# Patient Record
Sex: FEMALE | Race: BLACK OR AFRICAN AMERICAN | Employment: OTHER | URBAN - METROPOLITAN AREA
[De-identification: names, ages, dates, MRNs, and addresses within clinical notes are randomized per-mention and may not be internally consistent; named-entity substitution may affect disease eponyms.]

---

## 2018-05-18 ENCOUNTER — OFFICE VISIT (OUTPATIENT)
Dept: VASCULAR SURGERY | Age: 83
End: 2018-05-18

## 2018-05-18 VITALS
RESPIRATION RATE: 17 BRPM | BODY MASS INDEX: 16.33 KG/M2 | HEIGHT: 65 IN | HEART RATE: 50 BPM | SYSTOLIC BLOOD PRESSURE: 90 MMHG | WEIGHT: 98 LBS | DIASTOLIC BLOOD PRESSURE: 48 MMHG

## 2018-05-18 DIAGNOSIS — M20.61 DEFORMITY OF TOE OF RIGHT FOOT: ICD-10-CM

## 2018-05-18 DIAGNOSIS — I73.9 PAD (PERIPHERAL ARTERY DISEASE) (HCC): Primary | ICD-10-CM

## 2018-05-18 RX ORDER — HYDROCHLOROTHIAZIDE 25 MG/1
25 TABLET ORAL DAILY
COMMUNITY
End: 2020-01-01

## 2018-05-18 RX ORDER — METOPROLOL TARTRATE 50 MG/1
TABLET ORAL 2 TIMES DAILY
COMMUNITY
End: 2020-01-01

## 2018-05-18 RX ORDER — QUETIAPINE FUMARATE 50 MG/1
50 TABLET, FILM COATED ORAL 2 TIMES DAILY
COMMUNITY
End: 2020-01-01

## 2018-05-18 RX ORDER — CILOSTAZOL 100 MG/1
100 TABLET ORAL
Qty: 60 TAB | Refills: 3 | Status: SHIPPED | OUTPATIENT
Start: 2018-05-18 | End: 2020-01-01

## 2018-05-18 RX ORDER — OMEPRAZOLE 20 MG/1
20 CAPSULE, DELAYED RELEASE ORAL DAILY
COMMUNITY

## 2018-05-18 RX ORDER — MEMANTINE HYDROCHLORIDE 10 MG/1
TABLET ORAL DAILY
COMMUNITY

## 2018-05-18 NOTE — PROGRESS NOTES
Vignesh Hawley    Chief Complaint   Patient presents with   [employer] New Patient    Leg Pain     Poor circulation       History and Physical    80year-old female here for evaluation of peripheral artery disease. She is here with her niece and caregiver. She has had a long history of PAD she has had some interventions on the right leg with good success several years ago. 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 She is well cared for at home she lives with family she is able to do transfers and short distance walking with a walker otherwise uses wheelchair for assistance. She is a nondiabetic with hypertension gastroesophageal reflux. She has no renal insufficiency non-smoker. She is suffering from dementia as well. She has no rest pain she has some occasional foot pain seems to be more positional.  No open wounds currently    Past Medical History:   Diagnosis Date    Glaucoma     Heart abnormality     arrythmia    Hypertension     Reflux     Short leg syndrome, right, acquired      Patient Active Problem List   Diagnosis Code    HTN (hypertension) I10    Lipid disorder E78.9    Glaucoma H40.9    GERD (gastroesophageal reflux disease) K21.9    PAD [facility]peripheral artery disease) ([facility]) I73.9    Toe deformity M20.60     Past Surgical History:   Procedure Laterality Date    HX HEENT      VASCULAR SURGERY PROCEDURE UNLIST       Current Outpatient Prescriptions   Medication Sig Dispense Refill    omeprazole (PRILOSEC) 20 mg capsule Take 20 mg by mouth daily.  hydroCHLOROthiazide (HYDRODIURIL) 25 mg tablet Take 25 mg by mouth daily.  metoprolol tartrate (LOPRESSOR) 50 mg tablet Take  by mouth two (2) times a day.  memantine (NAMENDA) 10 mg tablet Take  by mouth daily.  QUEtiapine (SEROQUEL) 50 mg tablet Take 50 mg by mouth two (2) times a day.  cilostazol (PLETAL) 100 mg tablet Take 1 Tab by mouth Before breakfast and dinner.  60 Tab 3    aspirin delayed-release 81 mg tablet Take  by mouth daily.  senna (SENOKOT) 8.6 mg tablet Take 1 Tab by mouth daily.  polyethylene glycol (MIRALAX) 17 gram packet Take 17 g by mouth daily.  amLODIPine (NORVASC) 10 mg tablet Take  by mouth daily.  therapeutic multivitamin (THERAGRAN) tablet Take 1 Tab by mouth daily.  Calcium Gluconate 60 mg (648 mg) tab Take  by mouth.  levobunolol (BETAGAN) 0.5 % ophthalmic solution Administer 1 Drop to both eyes nightly.  brinzolamide (AZOPT) 1 % ophthalmic suspension Administer 1 Drop to both eyes three (3) times daily.  magnesium citrate solution Take 296 mL by mouth now.  metoclopramide HCl (REGLAN) 10 mg tablet Take 10 mg by mouth Before breakfast, lunch, dinner and at bedtime.  traMADol (ULTRAM) 50 mg tablet Take 50 mg by mouth every six (6) hours as needed for Pain.  atorvastatin (LIPITOR) 10 mg tablet Take  by mouth daily.  meloxicam (MOBIC) 7.5 mg tablet Take  by mouth daily.  meclizine (ANTIVERT) 25 mg tablet Take  by mouth three (3) times daily as needed.  RABEprazole (ACIPHEX) 20 mg tablet Take 20 mg by mouth daily. Allergies   Allergen Reactions    Tylenol [Acetaminophen] Other (comments)     GI distress       Review of Systems    A full review of systems was completed times ten organ systems and was deemed negative unless otherwise mentioned in the HPI.     Physical   Visit Vitals    BP 90/48 (BP 1 Location: Right arm)    Pulse (!) 50    Resp 17    Ht 5' 5\" (1.651 m)    Wt 98 lb (44.5 kg)    BMI 16.31 kg/m2       Youthful appearing for 93 head is normocephalic and atraumatic  Neck no JVD I hear no carotid bruit  Chest clear bilaterally  Cardiac regular  Abdomen soft flat nontender  Lower extremities notable there is right lower extremity deformity with equinus formation right foot, no  skin ulceration  Nonpalpable pedal pulses, skin intact  Reviewed her office information from Dr. Arminda Roldan he notably did an angiogram on the right lower extremity showing a small common femoral profunda a small SFA that is uniformly patent at 2-2.5 mm popliteal small and tibial occlusion of all 3 vessels, made attempt of wire passage without any success. Impression/Plan:     ICD-10-CM ICD-9-CM    1. PAD (peripheral artery disease) ([facility]) I73.9 443.9 cilostazol (PLETAL) 100 mg tablet   2. 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  Surgery likely not to give long-term benefit, small vessels with poor targets  Talked about initiation of medical therapy including Pletal she will remain on her aspirin  We went over issues of tachycardia or dysrhythmia and diarrhea  She will call if she has any side effects from Pletal, she is interested in initiating this for small vessel disease  Schedule office visit for 6 weeks  Orders Placed This Encounter    omeprazole (PRILOSEC) 20 mg capsule    hydroCHLOROthiazide (HYDRODIURIL) 25 mg tablet    metoprolol tartrate (LOPRESSOR) 50 mg tablet    memantine (NAMENDA) 10 mg tablet    QUEtiapine (SEROQUEL) 50 mg tablet    cilostazol (PLETAL) 100 mg tablet       Follow-up Disposition: Not on 2020 Amanda Townsend MD    PLEASE NOTE:  This document has been produced using voice recognition software. Unrecognized errors in transcription may be present.

## 2018-05-18 NOTE — PROGRESS NOTES
1. Have you been to an emergency room or urgent care clinic since your last visit? Yes [place], [place]    Hospitalized since your last visit? If yes, where, when, and reason for visit? [facility]    2. Have you seen or consulted any other health care providers outside of the [facility] since your last visit including any procedures, health maintenance items. If yes, where, when and reason for visit?   NO

## 2018-05-18 NOTE — MR AVS SNAPSHOT
[address] [address] 776 [facility] 
[phone] Patient: Scar Jain MRN: [id number] Visit Information Date & Time Provider Department Dept. Phone Encounter #  
 2018 11:00 AM Rashid Adams  [facility] [phone] Your Appointments 2018 11:45 AM  
Follow Up with Rashid Adams MD  
600 [facility] Appt Note: 6 WEEK FOLLOW UP WENT 7 DUE TO TRANSPORTATION WILL BE OUT OF STATE  
 [address] 145 266 [facility]  
[phone] 2300 [facility] [address] [place] [address] Upcoming Health Maintenance Date Due DTaP/Tdap/Td series (1 - Tdap) 1945 ZOSTER VACCINE AGE 60> 1984 GLAUCOMA SCREENING Q2Y 1989 Bone Densitometry (Dexa) Screening 1989 Pneumococcal 65+ Low/Medium Risk (1 of 2 - PCV13) 1989 MEDICARE YEARLY EXAM 3/14/2018 Influenza Age 5 to Adult 2018 Allergies as of 2018  Review Complete On: 2018 By: Rashid Adams MD  
  
 Severity Noted Reaction Type Reactions Tylenol [Acetaminophen]  2014    Other (comments) GI distress Current Immunizations  Never Reviewed No immunizations on file. Not reviewed this visit You Were Diagnosed With   
  
 Codes Comments PAD (peripheral artery disease) (HCC)    -  Primary ICD-10-CM: I73.9 ICD-9-CM: 443.9 Deformity of toe of right foot     ICD-10-CM: M20.61 ICD-9-CM: 735.9 Vitals BP Pulse Resp Height(growth percentile) Weight(growth percentile) BMI  
 90/48 (BP 1 Location: Right arm) (!) 50 17 5' 5\" (1.651 m) 98 lb (44.5 kg) 16.31 kg/m2 Smoking Status Never Smoker Vitals History BMI and BSA Data Body Mass Index Body Surface Area  
 16.31 kg/m 2 1.43 m 2 Your Updated Medication List  
 This list is accurate as of 5/18/18 12:23 PM.  Always use your most recent med list.  
  
  
  
  
 ACIPHEX 20 mg tablet Generic drug:  RABEprazole Take 20 mg by mouth daily. ANTIVERT 25 mg tablet Generic drug:  meclizine Take  by mouth three (3) times daily as needed. aspirin delayed-release 81 mg tablet Take  by mouth daily. AZOPT 1 % ophthalmic suspension Generic drug:  brinzolamide Administer 1 Drop to both eyes three (3) times daily. BETAGAN 0.5 % ophthalmic solution Generic drug:  levobunolol Administer 1 Drop to both eyes nightly. Calcium Gluconate 60 mg (648 mg) Tab Take  by mouth.  
  
 cilostazol 100 mg tablet Commonly known as:  PLETAL Take 1 Tab by mouth Before breakfast and dinner. hydroCHLOROthiazide 25 mg tablet Commonly known as:  HYDRODIURIL Take 25 mg by mouth daily. LIPITOR 10 mg tablet Generic drug:  atorvastatin Take  by mouth daily. magnesium citrate solution Take 296 mL by mouth now.  
  
 metoprolol tartrate 50 mg tablet Commonly known as:  LOPRESSOR Take  by mouth two (2) times a day. MIRALAX 17 gram packet Generic drug:  polyethylene glycol Take 17 g by mouth daily. MOBIC 7.5 mg tablet Generic drug:  meloxicam  
Take  by mouth daily. NAMENDA 10 mg tablet Generic drug:  memantine Take  by mouth daily. NORVASC 10 mg tablet Generic drug:  amLODIPine Take  by mouth daily. PriLOSEC 20 mg capsule Generic drug:  omeprazole Take 20 mg by mouth daily. REGLAN 10 mg tablet Generic drug:  metoclopramide HCl Take 10 mg by mouth Before breakfast, lunch, dinner and at bedtime. SENOKOT 8.6 mg tablet Generic drug:  senna Take 1 Tab by mouth daily. SEROquel 50 mg tablet Generic drug:  QUEtiapine Take 50 mg by mouth two (2) times a day. therapeutic multivitamin tablet Commonly known as:  [facility] Take 1 Tab by mouth daily. ULTRAM 50 mg tablet Generic drug:  traMADol Take 50 mg by mouth every six (6) hours as needed for Pain. Prescriptions Printed Refills  
 cilostazol (PLETAL) 100 mg tablet 3 Sig: Take 1 Tab by mouth Before breakfast and dinner. Class: Print Route: Oral  
  
Introducing [facility]! [employer] introduces Satago patient portal. Now you can access parts of your medical record, email your doctor's office, and request medication refills online. 1. In your internet browser, go to [email or web address] 2. Click on the First Time User? Click Here link in the Sign In box. You will see the New Member Sign Up page. 3. Enter your Satago Access Code exactly as it appears below. You will not need to use this code after youve completed the sign-up process. If you do not sign up before the expiration date, you must request a new code. · Satago Access Code: [email or web address] Expires: 8/16/2018 10:55 AM 
 
4. Enter the last four digits of your Social Security Number (xxxx) and Date of Birth (mm/dd/yyyy) as indicated and click Submit. You will be taken to the next sign-up page. 5. Create a Satago ID. This will be your Satago login ID and cannot be changed, so think of one that is secure and easy to remember. 6. Create a Satago password. You can change your password at any time. 7. Enter your Password Reset Question and Answer. This can be used at a later time if you forget your password. 8. Enter your e-mail address. You will receive e-mail notification when new information is available in [address] 9. Click Sign Up. You can now view and download portions of your medical record. 10. Click the Download Summary menu link to download a portable copy of your medical information. If you have questions, please visit the Frequently Asked Questions section of the Satago website.  Remember, Satago is NOT to be used for urgent needs. For medical emergencies, dial 911. Now available from your iPhone and Android! Please provide this summary of care documentation to your next provider. Your primary care clinician is listed as Yuliya Rojas. If you have any questions after today's visit, please call [phone].

## 2018-08-21 ENCOUNTER — OFFICE VISIT (OUTPATIENT)
Dept: VASCULAR SURGERY | Age: 83
End: 2018-08-21

## 2018-08-21 VITALS
HEIGHT: 65 IN | DIASTOLIC BLOOD PRESSURE: 60 MMHG | SYSTOLIC BLOOD PRESSURE: 110 MMHG | HEART RATE: 50 BPM | RESPIRATION RATE: 17 BRPM | BODY MASS INDEX: 16.33 KG/M2 | WEIGHT: 98 LBS

## 2018-08-21 DIAGNOSIS — I73.9 PAD (PERIPHERAL ARTERY DISEASE) (HCC): Primary | ICD-10-CM

## 2018-08-21 RX ORDER — CILOSTAZOL 100 MG/1
100 TABLET ORAL
Qty: 60 TAB | Refills: 3 | Status: SHIPPED | OUTPATIENT
Start: 2018-08-21 | End: 2018-12-18 | Stop reason: SDUPTHER

## 2018-08-21 NOTE — PROGRESS NOTES
1. Have you been to an emergency room or urgent care clinic since your last visit? no    Hospitalized since your last visit? If yes, where, when, and reason for visit? no  2. Have you seen or consulted any other health care providers outside of the [facility] since your last visit including any procedures, health maintenance items.  If yes, where, when and reason for visit? no

## 2018-08-21 NOTE — MR AVS SNAPSHOT
303 [facility] 
 
 
 [address] [place] [address] 265 200 [facility] 
[phone] Patient: Nuria Oliva MRN: [id number] Visit Information Date & Time Provider Department Dept. Phone Encounter #  
 2018 11:00 AM MD [facility] [phone] [id number] Follow-up Instructions Return in about 3 months (around 2018). Your Appointments 2018 11:30 AM  
Follow Up with MD [facility] 3651 [facility]) Appt Note: 3 month follow up with no studies per dr Dave Jama 1212 [facility] Brandon Sharma 577 200 [facility]  
[phone] 1212 [facility] 6000 [facility] 98, [place] 200 [facility] Upcoming Health Maintenance Date Due DTaP/Tdap/Td series (1 - Tdap) 1945 ZOSTER VACCINE AGE 60> 1984 GLAUCOMA SCREENING Q2Y 1989 Bone Densitometry (Dexa) Screening 1989 Pneumococcal 65+ Low/Medium Risk (1 of 2 - PCV13) 1989 MEDICARE YEARLY EXAM 3/14/2018 Influenza Age 5 to Adult 2018 Allergies as of 2018  Review Complete On: 2018 By: Miguel Ángel Cintron MD  
  
 Severity Noted Reaction Type Reactions Tylenol [Acetaminophen]  2014    Other (comments) GI distress Current Immunizations  Never Reviewed No immunizations on file. Not reviewed this visit You Were Diagnosed With   
  
 Codes Comments PAD (peripheral artery disease) (HCC)    -  Primary ICD-10-CM: I73.9 ICD-9-CM: 443. 9 Vitals BP Pulse Resp Height(growth percentile) Weight(growth percentile) BMI  
 110/60 (BP 1 Location: Left arm, BP Patient Position: Sitting) (!) 50 17 5' 5\" (1.651 m) 98 lb (44.5 kg) 16.31 kg/m2 Smoking Status Never Smoker Vitals History BMI and BSA Data  Body Mass Index Body Surface Area  
 16.31 kg/m 2 1.43 m 2  
  
  
 Your Updated Medication List  
  
   
This list is accurate as of 8/21/18 11:35 AM.  Always use your most recent med list.  
  
  
  
  
 ACIPHEX 20 mg tablet Generic drug:  RABEprazole Take 20 mg by mouth daily. ANTIVERT 25 mg tablet Generic drug:  meclizine Take  by mouth three (3) times daily as needed. aspirin delayed-release 81 mg tablet Take  by mouth daily. AZOPT 1 % ophthalmic suspension Generic drug:  brinzolamide Administer 1 Drop to both eyes three (3) times daily. BETAGAN 0.5 % ophthalmic solution Generic drug:  levobunolol Administer 1 Drop to both eyes nightly. Calcium Gluconate 60 mg (648 mg) Tab Take  by mouth. * cilostazol 100 mg tablet Commonly known as:  PLETAL Take 1 Tab by mouth Before breakfast and dinner. * cilostazol 100 mg tablet Commonly known as:  PLETAL Take 1 Tab by mouth Before breakfast and dinner. hydroCHLOROthiazide 25 mg tablet Commonly known as:  HYDRODIURIL Take 25 mg by mouth daily. LIPITOR 10 mg tablet Generic drug:  atorvastatin Take  by mouth daily. magnesium citrate solution Take 296 mL by mouth now.  
  
 metoprolol tartrate 50 mg tablet Commonly known as:  LOPRESSOR Take  by mouth two (2) times a day. MIRALAX 17 gram packet Generic drug:  polyethylene glycol Take 17 g by mouth daily. MOBIC 7.5 mg tablet Generic drug:  meloxicam  
Take  by mouth daily. NAMENDA 10 mg tablet Generic drug:  memantine Take  by mouth daily. NORVASC 10 mg tablet Generic drug:  amLODIPine Take  by mouth daily. PriLOSEC 20 mg capsule Generic drug:  omeprazole Take 20 mg by mouth daily. REGLAN 10 mg tablet Generic drug:  metoclopramide HCl Take 10 mg by mouth Before breakfast, lunch, dinner and at bedtime. SENOKOT 8.6 mg tablet Generic drug:  senna Take 1 Tab by mouth daily. SEROquel 50 mg tablet Generic drug:  QUEtiapine Take 50 mg by mouth two (2) times a day. therapeutic multivitamin tablet Commonly known as:  [facility] Take 1 Tab by mouth daily. ULTRAM 50 mg tablet Generic drug:  traMADol Take 50 mg by mouth every six (6) hours as needed for Pain. * Notice: This list has 2 medication(s) that are the same as other medications prescribed for you. Read the directions carefully, and ask your doctor or other care provider to review them with you. Prescriptions Printed Refills  
 cilostazol (PLETAL) 100 mg tablet 3 Sig: Take 1 Tab by mouth Before breakfast and dinner. Class: Print Route: Oral  
  
Follow-up Instructions Return in about 3 months (around 11/21/2018). Introducing [facility]! [employer] Guerin introduces Regenerate patient portal. Now you can access parts of your medical record, email your doctor's office, and request medication refills online. 1. In your internet browser, go to [email or web address] 2. Click on the First Time User? Click Here link in the Sign In box. You will see the New Member Sign Up page. 3. Enter your Regenerate Access Code exactly as it appears below. You will not need to use this code after youve completed the sign-up process. If you do not sign up before the expiration date, you must request a new code. · Regenerate Access Code: [email or web address] Expires: 11/19/2018 11:01 AM 
 
4. Enter the last four digits of your Social Security Number (xxxx) and Date of Birth (mm/dd/yyyy) as indicated and click Submit. You will be taken to the next sign-up page. 5. Create a Regenerate ID. This will be your Regenerate login ID and cannot be changed, so think of one that is secure and easy to remember. 6. Create a Regenerate password. You can change your password at any time. 7. Enter your Password Reset Question and Answer. This can be used at a later time if you forget your password. 8. Enter your e-mail address. You will receive e-mail notification when new information is available in [address] 9. Click Sign Up. You can now view and download portions of your medical record. 10. Click the Download Summary menu link to download a portable copy of your medical information. If you have questions, please visit the Frequently Asked Questions section of the [email or web address] website. Remember, [email or web address] is NOT to be used for urgent needs. For medical emergencies, dial 911. Now available from your iPhone and Android! Please provide this summary of care documentation to your next provider. Your primary care clinician is listed as Jaycee Dougherty. If you have any questions after today's visit, please call [phone].

## 2018-12-07 ENCOUNTER — OFFICE VISIT (OUTPATIENT)
Dept: VASCULAR SURGERY | Age: 83
End: 2018-12-07

## 2018-12-07 VITALS
RESPIRATION RATE: 16 BRPM | BODY MASS INDEX: 16.33 KG/M2 | SYSTOLIC BLOOD PRESSURE: 124 MMHG | DIASTOLIC BLOOD PRESSURE: 68 MMHG | HEART RATE: 76 BPM | HEIGHT: 65 IN | WEIGHT: 98 LBS

## 2018-12-07 DIAGNOSIS — I73.9 PAD (PERIPHERAL ARTERY DISEASE) (HCC): Primary | ICD-10-CM

## 2018-12-07 NOTE — PROGRESS NOTES
1. Have you been to an emergency room or urgent care clinic since your last visit? NO Hospitalized since your last visit? If yes, where, when, and reason for visit? NO 
2. Have you seen or consulted any other health care providers outside of the [facility] since your last visit including any procedures, health maintenance items. If yes, where, when and reason for visit?  NO

## 2018-12-07 NOTE — PROGRESS NOTES
Nakul Pathak Chief Complaint Patient presents with  Leg Pain History and Physical   
80year-old female here following up regarding her response to therapy and evaluation of peripheral artery disease. I do initiated on antiplatelet and Pletal therapy for treatment of PAD. She is minimally ambulatory she has 6 equinus chronic right foot and PAD bilateral.  She had an ulceration before on her right lateral heel in her right great toe. The patient tells me she is not having pain anymore since being on the Pletal but her caregiver says she does complain of pain in the mornings especially. Past Medical History:  
Diagnosis Date  Glaucoma  Heart abnormality   
 arrythmia  Hypertension  Reflux  Short leg syndrome, right, acquired Patient Active Problem List  
Diagnosis Code  
 HTN (hypertension) I10  
 Lipid disorder E78.9  Glaucoma H40.9  GERD (gastroesophageal reflux disease) K21.9  
 PAD (peripheral artery disease) ([facility]) I73.9  Toe deformity M20.60 Past Surgical History:  
Procedure Laterality Date  HX HEENT  VASCULAR SURGERY PROCEDURE UNLIST Current Outpatient Medications Medication Sig Dispense Refill  cilostazol (PLETAL) 100 mg tablet Take 1 Tab by mouth Before breakfast and dinner. 60 Tab 3  
 omeprazole (PRILOSEC) 20 mg capsule Take 20 mg by mouth daily.  hydroCHLOROthiazide (HYDRODIURIL) 25 mg tablet Take 25 mg by mouth daily.  metoprolol tartrate (LOPRESSOR) 50 mg tablet Take  by mouth two (2) times a day.  memantine (NAMENDA) 10 mg tablet Take  by mouth daily.  QUEtiapine (SEROQUEL) 50 mg tablet Take 50 mg by mouth two (2) times a day.  cilostazol (PLETAL) 100 mg tablet Take 1 Tab by mouth Before breakfast and dinner. 60 Tab 3  
 aspirin delayed-release 81 mg tablet Take  by mouth daily.  magnesium citrate solution Take 296 mL by mouth now.  metoclopramide HCl (REGLAN) 10 mg tablet Take 10 mg by mouth Before breakfast, lunch, dinner and at bedtime.  senna (SENOKOT) 8.6 mg tablet Take 1 Tab by mouth daily.  traMADol (ULTRAM) 50 mg tablet Take 50 mg by mouth every six (6) hours as needed for Pain.  atorvastatin (LIPITOR) 10 mg tablet Take  by mouth daily.  meloxicam (MOBIC) 7.5 mg tablet Take  by mouth daily.  polyethylene glycol (MIRALAX) 17 gram packet Take 17 g by mouth daily.  meclizine (ANTIVERT) 25 mg tablet Take  by mouth three (3) times daily as needed.  amLODIPine (NORVASC) 10 mg tablet Take  by mouth daily.  RABEprazole (ACIPHEX) 20 mg tablet Take 20 mg by mouth daily.  therapeutic multivitamin (THERAGRAN) tablet Take 1 Tab by mouth daily.  Calcium Gluconate 60 mg (648 mg) tab Take  by mouth.  levobunolol (BETAGAN) 0.5 % ophthalmic solution Administer 1 Drop to both eyes nightly.  brinzolamide (AZOPT) 1 % ophthalmic suspension Administer 1 Drop to both eyes three (3) times daily. Allergies Allergen Reactions  Tylenol [Acetaminophen] Other (comments) GI distress Review of Systems A full review of systems was completed times ten organ systems and was deemed negative unless otherwise mentioned in the HPI. Physical  
Visit Vitals /68 (BP 1 Location: Left arm, BP Patient Position: Sitting) Pulse 76 Resp 16 Ht 5' 5\" (1.651 m) Wt 98 lb (44.5 kg) BMI 16.31 kg/m² She is in her wheelchair she is responsive alert seems to be good historian to me today. Her caregiver is with her to help out with history Head is atraumatic pupils are reactive Neck no JVD Chest clear Cardiac regular Abdomen soft nontender Right lower extreme and he has excess cauda equinus deformity she has a healed right lateral ankle ulcer and a healed anterior great toe ulcer foot is warm Left lower extremity with no ulceration Impression/Plan: ICD-10-CM ICD-9-CM 1. PAD (peripheral artery disease) ([facility]) I73.9 443.9 She was previously scheduled for lower extremity PVL as part of her response to therapy was was unable to get this done. We will schedule this now and follow-up with her in 1 week Jimy Narrow She is possibly showing good response to Pletal she tells me she is not having much pain. She may be having some pain but her ulcers have healed No orders of the defined types were placed in this encounter. Follow-up Disposition: 
Return in about 1 week (around 12/14/2018). Tom Maldonado MD 
 
PLEASE NOTE: 
This document has been produced using voice recognition software. Unrecognized errors in transcription may be present.

## 2018-12-18 DIAGNOSIS — I73.9 PAD (PERIPHERAL ARTERY DISEASE) (HCC): ICD-10-CM

## 2018-12-18 RX ORDER — CILOSTAZOL 100 MG/1
TABLET ORAL
Qty: 60 TAB | Refills: 3 | Status: SHIPPED | OUTPATIENT
Start: 2018-12-18 | End: 2020-01-01

## 2018-12-20 ENCOUNTER — OFFICE VISIT (OUTPATIENT)
Dept: VASCULAR SURGERY | Age: 83
End: 2018-12-20

## 2018-12-20 VITALS
HEIGHT: 65 IN | BODY MASS INDEX: 16.33 KG/M2 | WEIGHT: 98 LBS | RESPIRATION RATE: 16 BRPM | SYSTOLIC BLOOD PRESSURE: 130 MMHG | HEART RATE: 74 BPM | DIASTOLIC BLOOD PRESSURE: 70 MMHG

## 2018-12-20 DIAGNOSIS — I77.89 TIBIAL ARTERY DISEASE (HCC): ICD-10-CM

## 2018-12-20 DIAGNOSIS — I73.9 PAD (PERIPHERAL ARTERY DISEASE) (HCC): Primary | ICD-10-CM

## 2018-12-20 NOTE — PROGRESS NOTES
1. Have you been to an emergency room or urgent care clinic since your last visit? No    Hospitalized since your last visit? If yes, where, when, and reason for visit? No  2. Have you seen or consulted any other health care providers outside of the [facility] since your last visit including any procedures, health maintenance items. If yes, where, when and reason for visit?  No

## 2018-12-20 NOTE — PROGRESS NOTES
Ms. Fern Mcdermott was in earlier this month with Dr. Dalia Smith for follow-up on her PAD and response of Pletal.  Unfortunately follow-up studies had not been done so he ordered those and she is here in follow-up for those today. At that visit it had been appreciated that a lot of her pain seemed to have generally improved. We do see with her vascular studies that there is a marginal improvement in her LEE ANN on the right from 0.48 to 0.56. 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 She has close follow-up with podiatry about every 2 months and so if there is any notable changes or concerns to prompt repeat vascular evaluation we can certainly see her back sooner. She would prefer to regular follow-up routine so we will plan on seeing her back in 6 months. Does not seem at this time that there is any need for any type of endovascular evaluation. Surgical intervention has already been deemed not an option for her.  Continue best medical therapy

## 2019-02-04 ENCOUNTER — TELEPHONE (OUTPATIENT)
Dept: VASCULAR SURGERY | Age: 84
End: 2019-02-04

## 2019-02-04 NOTE — TELEPHONE ENCOUNTER
----- Message from KALI Ortiz sent at 2/1/2019 12:12 PM EST -----  No studies  We can offer pain medication like tramadol as we are limited in what we can offer to help her though     ----- Message -----  From: Lynnette Carter RN  Sent: 2/1/2019  12:05 PM  To: KALI Ortiz    What would you like to do?  ----- Message -----  From: Tristan Josafat  Sent: 2/1/2019  11:50 AM  To: Tadeo Burgos RN    Patient's caregiver called and states patient is having severe pain in the back of her legs, so much so she doesn't want to get out of bed. Should I just make a follow up appointment? Or does she needs studies?  Kamran Sosa is the caregiver, her # [phone]

## 2019-03-04 ENCOUNTER — TELEPHONE (OUTPATIENT)
Dept: VASCULAR SURGERY | Age: 84
End: 2019-03-04

## 2019-03-04 NOTE — TELEPHONE ENCOUNTER
Karine, caregiver called stating that patient is having pain and swelling on her left LE and wanted to know if she can be seen this week. She is scheduled for a follow up with Dr. Cari Harris in June 2019. Advised that we will call back for the appt.

## 2019-03-08 ENCOUNTER — OFFICE VISIT (OUTPATIENT)
Dept: VASCULAR SURGERY | Age: 84
End: 2019-03-08

## 2019-03-08 VITALS
DIASTOLIC BLOOD PRESSURE: 84 MMHG | RESPIRATION RATE: 17 BRPM | SYSTOLIC BLOOD PRESSURE: 130 MMHG | HEIGHT: 65 IN | HEART RATE: 68 BPM | WEIGHT: 98 LBS | BODY MASS INDEX: 16.33 KG/M2

## 2019-03-08 DIAGNOSIS — I73.9 PAD (PERIPHERAL ARTERY DISEASE) (HCC): Primary | ICD-10-CM

## 2019-03-08 DIAGNOSIS — M79.606 LEG PAIN, DIFFUSE, UNSPECIFIED LATERALITY: ICD-10-CM

## 2019-03-08 RX ORDER — SENNOSIDES 25 MG/1
TABLET, FILM COATED ORAL
Qty: 45 G | Refills: 6 | Status: SHIPPED | OUTPATIENT
Start: 2019-03-08 | End: 2019-03-13 | Stop reason: CLARIF

## 2019-03-08 NOTE — PROGRESS NOTES
1. Have you been to an emergency room or urgent care clinic since your last visit? NO    Hospitalized since your last visit? If yes, where, when, and reason for visit? NO  2. Have you seen or consulted any other health care providers outside of the [facility] since your last visit including any procedures, health maintenance items. If yes, where, when and reason for visit?  NO

## 2019-03-08 NOTE — PROGRESS NOTES
Ms. Nandini Barry was in with both myself and Dr. Washington Bowen in December for follow-up on her PAD and response of Pletal.    She has known severe peripheral arterial disease, for which surgical intervention has already been deemed not an option for her. We have attempted to do best medical therapy and introduced the use of Pletal at that visit in December. She was then transitioned to a surveillance routine of every 6 months and was due to follow-up this June    She comes in today though with reports of increased pain of the lower extremities. She is here with her regular caretaker today who provides most of the history. Ms. Nandini Barry herself does suffer from dementia so is not a good historian. The caretaker states that they do try to limit the use of tramadol which has been prescribed to her previously. They do use the topical 2 old goats pain reliever cream, and some days it seems to help and some days not. Her pain is not specifically localized to any given area any given day. However she does continue to complain of daily pain. It sounds that some pain is more increased at night, but again her consistency and her description of her pain is mostly inconsistent. As an alternate to anything else oral for pain relief or topical, I ended up prescribing a lidocaine ointment. This may perhaps be more effective than the other ointment she is using. And it can be applied in different areas as its needed. She has a few areas of some dry scabs which just look like some areas of excoriation of the skin, but they are viable and healing well. I made suggestions to use Aquaphor is just a skin moisturizer when she gets areas like this.   But there are otherwise no ischemic changes or concerns at this time    We will plan on seeing her back as scheduled

## 2019-03-13 DIAGNOSIS — I73.9 PAD (PERIPHERAL ARTERY DISEASE) (HCC): Primary | ICD-10-CM

## 2019-03-13 RX ORDER — LIDOCAINE AND PRILOCAINE 25; 25 MG/G; MG/G
CREAM TOPICAL AS NEEDED
Qty: 30 G | Refills: 3 | Status: SHIPPED | OUTPATIENT
Start: 2019-03-13 | End: 2020-01-01

## 2019-03-13 NOTE — TELEPHONE ENCOUNTER
Order for emla cream, to be applied to effected areas for pain relief, 30 g /3  placed per Verbal order from Arsen Rome .

## 2019-06-21 ENCOUNTER — OFFICE VISIT (OUTPATIENT)
Dept: VASCULAR SURGERY | Age: 84
End: 2019-06-21

## 2019-06-21 VITALS
BODY MASS INDEX: 16.33 KG/M2 | RESPIRATION RATE: 18 BRPM | WEIGHT: 98 LBS | SYSTOLIC BLOOD PRESSURE: 128 MMHG | HEIGHT: 65 IN | HEART RATE: 78 BPM | DIASTOLIC BLOOD PRESSURE: 70 MMHG

## 2019-06-21 DIAGNOSIS — I73.9 PAD (PERIPHERAL ARTERY DISEASE) (HCC): Primary | ICD-10-CM

## 2019-06-21 NOTE — PROGRESS NOTES
Lissette Douglass Chief Complaint Patient presents with  Leg Pain History and Physical   
80year-old female here for for appropriateness for a permanent nail procedure. She continues to have persistent ingrown nails on the left great toe. Visit today at the request of her podiatrist there is been pus drainage is a concern of infection as well. She is treated for hypertension cardiac arrhythmia and PAD. Past Medical History:  
Diagnosis Date  Glaucoma  Heart abnormality   
 arrythmia  Hypertension  Reflux  Short leg syndrome, right, acquired Patient Active Problem List  
Diagnosis Code  
 HTN (hypertension) I10  
 Lipid disorder E78.9  Glaucoma H40.9  GERD (gastroesophageal reflux disease) K21.9  
 PAD (peripheral artery disease) ([facility]) I73.9  Toe deformity M20.60 Past Surgical History:  
Procedure Laterality Date  HX HEENT  VASCULAR SURGERY PROCEDURE UNLIST Current Outpatient Medications Medication Sig Dispense Refill  lidocaine-prilocaine (EMLA) topical cream Apply  to affected area as needed for Pain. 30 g 3  
 cilostazol (PLETAL) 100 mg tablet take 1 tablet by mouth BEFORE BREAKFAST AND DINNER 60 Tab 3  
 omeprazole (PRILOSEC) 20 mg capsule Take 20 mg by mouth daily.  hydroCHLOROthiazide (HYDRODIURIL) 25 mg tablet Take 25 mg by mouth daily.  metoprolol tartrate (LOPRESSOR) 50 mg tablet Take  by mouth two (2) times a day.  memantine (NAMENDA) 10 mg tablet Take  by mouth daily.  QUEtiapine (SEROQUEL) 50 mg tablet Take 50 mg by mouth two (2) times a day.  cilostazol (PLETAL) 100 mg tablet Take 1 Tab by mouth Before breakfast and dinner. 60 Tab 3  
 aspirin delayed-release 81 mg tablet Take  by mouth daily.  magnesium citrate solution Take 296 mL by mouth now.  metoclopramide HCl (REGLAN) 10 mg tablet Take 10 mg by mouth Before breakfast, lunch, dinner and at bedtime.  senna (SENOKOT) 8.6 mg tablet Take 1 Tab by mouth daily.  traMADol (ULTRAM) 50 mg tablet Take 50 mg by mouth every six (6) hours as needed for Pain.  atorvastatin (LIPITOR) 10 mg tablet Take  by mouth daily.  meloxicam (MOBIC) 7.5 mg tablet Take  by mouth daily.  polyethylene glycol (MIRALAX) 17 gram packet Take 17 g by mouth daily.  meclizine (ANTIVERT) 25 mg tablet Take  by mouth three (3) times daily as needed.  amLODIPine (NORVASC) 10 mg tablet Take  by mouth daily.  RABEprazole (ACIPHEX) 20 mg tablet Take 20 mg by mouth daily.  therapeutic multivitamin (THERAGRAN) tablet Take 1 Tab by mouth daily.  Calcium Gluconate 60 mg (648 mg) tab Take  by mouth.  levobunolol (BETAGAN) 0.5 % ophthalmic solution Administer 1 Drop to both eyes nightly.  brinzolamide (AZOPT) 1 % ophthalmic suspension Administer 1 Drop to both eyes three (3) times daily. Allergies Allergen Reactions  Tylenol [Acetaminophen] Other (comments) GI distress Review of Systems A full review of systems was completed times ten organ systems and was deemed negative unless otherwise mentioned in the HPI. Physical  
Visit Vitals /70 (BP 1 Location: Left arm, BP Patient Position: Sitting) Pulse 78 Resp 18 Ht 5' 5\" (1.651 m) Wt 98 lb (44.5 kg) BMI 16.31 kg/m² Ms. Kelly Baez is a remarkably youthful 80year-old in no distress today Head is normocephalic no facial symmetry Neck no JVD Chest clear Cardiac regular soft nontender Extremities are without edema thin statured range of motion strength are equal she does get around the assistance of this wheelchair. She can stand and pivot although. Her left foot has nonpalpable pulses below the knee.   Her Doppler signal is strong at the dorsalis pedis by handheld Doppler and moderate at the posterior tibial.  Her toe coloration does not show any discoloration no signs of acute arterial insufficiency or chronic really at this point. I reviewed her Doppler on the left leg she has multiphasic signals at the femoral-popliteal segment distal tibials. Monophasic by waveform Impression/Plan: ICD-10-CM ICD-9-CM 1. PAD (peripheral artery disease) ([facility]) I73.9 443.9 I think she is moderate to high risk for angiography. Her foot is warm appears well for perfused and has easily obtainable Doppler signals. I believe she is clear for a permanent toenail procedure. I would like to see her in short interval follow-up within a week or 2 after procedure to ensure there is no ischemic appearance. Her caretaker said she would call make an appointment at this timeframe after her nail procedure is scheduled. No orders of the defined types were placed in this encounter. Yvonne Grover MD 
 
PLEASE NOTE: 
This document has been produced using voice recognition software. Unrecognized errors in transcription may be present.

## 2019-06-21 NOTE — LETTER
6/21/19 Patient: Afsaneh Dumont YOB: 1924 Date of Visit: 6/21/2019 KALI Bruner 
[address] [place], [place] [id number] [place] [address] VIA Facsimile: [phone] Dear KALI Bruner, Thank you for referring Ms. Terry Marx to [facility] for evaluation. My notes for this consultation are attached. If you have questions, please do not hesitate to call me. I look forward to following your patient along with you. Sincerely, Conor Chavez MD

## 2020-01-01 ENCOUNTER — ANESTHESIA EVENT (OUTPATIENT)
Dept: CARDIAC CATH/INVASIVE PROCEDURES | Age: 85
End: 2020-01-01
Payer: MEDICARE

## 2020-01-01 ENCOUNTER — HOSPITAL ENCOUNTER (OUTPATIENT)
Dept: GENERAL RADIOLOGY | Age: 85
Discharge: HOME | DRG: 690 | End: 2020-11-05
Attending: EMERGENCY MEDICINE
Payer: MEDICARE

## 2020-01-01 ENCOUNTER — HOME CARE VISIT (OUTPATIENT)
Dept: SCHEDULING | Facility: HOME HEALTH | Age: 85
End: 2020-01-01
Payer: MEDICARE

## 2020-01-01 ENCOUNTER — HOME CARE VISIT (OUTPATIENT)
Dept: HOSPICE | Facility: HOSPICE | Age: 85
End: 2020-01-01
Payer: MEDICARE

## 2020-01-01 ENCOUNTER — HOSPITAL ENCOUNTER (OUTPATIENT)
Dept: LAB | Age: 85
Discharge: HOME | End: 2020-10-12
Payer: COMMERCIAL

## 2020-01-01 ENCOUNTER — HOSPITAL ENCOUNTER (OUTPATIENT)
Dept: LAB | Age: 85
Discharge: HOME | End: 2020-10-24

## 2020-01-01 ENCOUNTER — HOSPITAL ENCOUNTER (OUTPATIENT)
Dept: LAB | Age: 85
Discharge: HOME | End: 2020-11-19

## 2020-01-01 ENCOUNTER — TELEPHONE (OUTPATIENT)
Dept: VASCULAR SURGERY | Age: 85
End: 2020-01-01

## 2020-01-01 ENCOUNTER — OFFICE VISIT (OUTPATIENT)
Dept: VASCULAR SURGERY | Age: 85
End: 2020-01-01

## 2020-01-01 ENCOUNTER — HOSPITAL ENCOUNTER (OUTPATIENT)
Dept: LAB | Age: 85
Discharge: HOME | End: 2020-11-24

## 2020-01-01 ENCOUNTER — HOSPITAL ENCOUNTER (OUTPATIENT)
Dept: LAB | Age: 85
Discharge: HOME | DRG: 690 | End: 2020-11-05
Payer: MEDICARE

## 2020-01-01 ENCOUNTER — HOSPITAL ENCOUNTER (OUTPATIENT)
Dept: LAB | Age: 85
Discharge: HOME | End: 2020-10-30

## 2020-01-01 ENCOUNTER — ANESTHESIA (OUTPATIENT)
Dept: CARDIAC CATH/INVASIVE PROCEDURES | Age: 85
End: 2020-01-01
Payer: MEDICARE

## 2020-01-01 ENCOUNTER — HOSPITAL ENCOUNTER (OUTPATIENT)
Age: 85
Discharge: HOME | End: 2020-02-05
Attending: SURGERY | Admitting: SURGERY
Payer: MEDICARE

## 2020-01-01 ENCOUNTER — DOCUMENTATION ONLY (OUTPATIENT)
Dept: VASCULAR SURGERY | Age: 85
End: 2020-01-01

## 2020-01-01 ENCOUNTER — HOSPITAL ENCOUNTER (INPATIENT)
Age: 85
LOS: 4 days | Discharge: NURSING, REHAB OR LONG-TERM CARE | DRG: 690 | End: 2020-11-09
Attending: EMERGENCY MEDICINE | Admitting: HOSPITALIST
Payer: MEDICARE

## 2020-01-01 ENCOUNTER — VIRTUAL VISIT (OUTPATIENT)
Dept: VASCULAR SURGERY | Age: 85
End: 2020-01-01

## 2020-01-01 ENCOUNTER — HOSPITAL ENCOUNTER (OUTPATIENT)
Dept: LAB | Age: 85
Discharge: HOME | End: 2020-11-11

## 2020-01-01 ENCOUNTER — APPOINTMENT (OUTPATIENT)
Dept: NON INVASIVE DIAGNOSTICS | Age: 85
DRG: 690 | End: 2020-01-01
Attending: HOSPITALIST
Payer: MEDICARE

## 2020-01-01 ENCOUNTER — HOSPITAL ENCOUNTER (OUTPATIENT)
Dept: LAB | Age: 85
Discharge: HOME | End: 2020-12-01

## 2020-01-01 ENCOUNTER — HOSPITAL ENCOUNTER (OUTPATIENT)
Dept: LAB | Age: 85
Discharge: HOME | End: 2020-11-11
Payer: MEDICARE

## 2020-01-01 ENCOUNTER — HOSPITAL ENCOUNTER (OUTPATIENT)
Age: 85
Discharge: HOME | End: 2020-03-11
Attending: SURGERY | Admitting: SURGERY
Payer: MEDICARE

## 2020-01-01 ENCOUNTER — HOSPICE ADMISSION (OUTPATIENT)
Dept: HOSPICE | Facility: HOSPICE | Age: 85
End: 2020-01-01
Payer: MEDICARE

## 2020-01-01 VITALS
WEIGHT: 98 LBS | BODY MASS INDEX: 16.33 KG/M2 | RESPIRATION RATE: 17 BRPM | SYSTOLIC BLOOD PRESSURE: 130 MMHG | HEIGHT: 65 IN | DIASTOLIC BLOOD PRESSURE: 70 MMHG

## 2020-01-01 VITALS
DIASTOLIC BLOOD PRESSURE: 75 MMHG | WEIGHT: 98 LBS | SYSTOLIC BLOOD PRESSURE: 161 MMHG | OXYGEN SATURATION: 98 % | HEIGHT: 63 IN | TEMPERATURE: 97.5 F | HEART RATE: 72 BPM | BODY MASS INDEX: 17.36 KG/M2 | RESPIRATION RATE: 33 BRPM

## 2020-01-01 VITALS
BODY MASS INDEX: 17.01 KG/M2 | OXYGEN SATURATION: 94 % | TEMPERATURE: 97.5 F | SYSTOLIC BLOOD PRESSURE: 116 MMHG | WEIGHT: 96 LBS | RESPIRATION RATE: 20 BRPM | DIASTOLIC BLOOD PRESSURE: 54 MMHG | HEIGHT: 63 IN | HEART RATE: 68 BPM

## 2020-01-01 VITALS
WEIGHT: 109.5 LBS | HEIGHT: 63 IN | TEMPERATURE: 97.7 F | BODY MASS INDEX: 19.4 KG/M2 | DIASTOLIC BLOOD PRESSURE: 81 MMHG | SYSTOLIC BLOOD PRESSURE: 182 MMHG | OXYGEN SATURATION: 97 % | HEART RATE: 58 BPM | RESPIRATION RATE: 19 BRPM

## 2020-01-01 VITALS
WEIGHT: 96 LBS | HEIGHT: 63 IN | DIASTOLIC BLOOD PRESSURE: 60 MMHG | HEART RATE: 82 BPM | RESPIRATION RATE: 16 BRPM | BODY MASS INDEX: 17.01 KG/M2 | TEMPERATURE: 97.5 F | OXYGEN SATURATION: 95 % | SYSTOLIC BLOOD PRESSURE: 100 MMHG

## 2020-01-01 VITALS
WEIGHT: 109 LBS | BODY MASS INDEX: 19.31 KG/M2 | SYSTOLIC BLOOD PRESSURE: 110 MMHG | DIASTOLIC BLOOD PRESSURE: 60 MMHG | HEART RATE: 53 BPM | HEIGHT: 63 IN | OXYGEN SATURATION: 99 % | RESPIRATION RATE: 17 BRPM

## 2020-01-01 VITALS
RESPIRATION RATE: 14 BRPM | HEART RATE: 94 BPM | SYSTOLIC BLOOD PRESSURE: 90 MMHG | DIASTOLIC BLOOD PRESSURE: 60 MMHG | OXYGEN SATURATION: 95 % | TEMPERATURE: 98.4 F

## 2020-01-01 VITALS
OXYGEN SATURATION: 96 % | HEART RATE: 72 BPM | DIASTOLIC BLOOD PRESSURE: 70 MMHG | SYSTOLIC BLOOD PRESSURE: 118 MMHG | TEMPERATURE: 98.1 F | RESPIRATION RATE: 16 BRPM

## 2020-01-01 VITALS
HEART RATE: 58 BPM | BODY MASS INDEX: 14.14 KG/M2 | WEIGHT: 79.8 LBS | DIASTOLIC BLOOD PRESSURE: 60 MMHG | TEMPERATURE: 97.7 F | SYSTOLIC BLOOD PRESSURE: 102 MMHG | OXYGEN SATURATION: 95 % | RESPIRATION RATE: 14 BRPM

## 2020-01-01 VITALS
WEIGHT: 98 LBS | DIASTOLIC BLOOD PRESSURE: 70 MMHG | HEIGHT: 63 IN | RESPIRATION RATE: 18 BRPM | OXYGEN SATURATION: 98 % | HEART RATE: 68 BPM | BODY MASS INDEX: 17.36 KG/M2 | SYSTOLIC BLOOD PRESSURE: 102 MMHG

## 2020-01-01 DIAGNOSIS — T82.868A THROMBOSIS OF RENAL DIALYSIS ARTERIOVENOUS GRAFT (HCC): ICD-10-CM

## 2020-01-01 DIAGNOSIS — I73.9 PAD (PERIPHERAL ARTERY DISEASE) (HCC): Primary | ICD-10-CM

## 2020-01-01 DIAGNOSIS — L97.511 SKIN ULCER OF RIGHT FOOT, LIMITED TO BREAKDOWN OF SKIN (HCC): ICD-10-CM

## 2020-01-01 DIAGNOSIS — M79.604 RIGHT LEG PAIN: ICD-10-CM

## 2020-01-01 DIAGNOSIS — I77.89 TIBIAL ARTERY DISEASE (HCC): ICD-10-CM

## 2020-01-01 DIAGNOSIS — I48.0 PAROXYSMAL ATRIAL FIBRILLATION (HCC): ICD-10-CM

## 2020-01-01 DIAGNOSIS — I73.9 PAD (PERIPHERAL ARTERY DISEASE) (HCC): ICD-10-CM

## 2020-01-01 LAB
ALBUMIN SERPL-MCNC: 1.8 G/DL (ref 3.4–5)
ALBUMIN SERPL-MCNC: 2.1 G/DL (ref 3.4–5)
ALBUMIN SERPL-MCNC: 2.2 G/DL (ref 3.4–5)
ALBUMIN SERPL-MCNC: 2.4 G/DL (ref 3.4–5)
ALBUMIN SERPL-MCNC: 2.6 G/DL (ref 3.4–5)
ALBUMIN/GLOB SERPL: 0.6 {RATIO} (ref 0.8–1.7)
ALBUMIN/GLOB SERPL: 0.6 {RATIO} (ref 0.8–1.7)
ALBUMIN/GLOB SERPL: 0.7 {RATIO} (ref 0.8–1.7)
ALBUMIN/GLOB SERPL: 0.8 {RATIO} (ref 0.8–1.7)
ALBUMIN/GLOB SERPL: 0.8 {RATIO} (ref 0.8–1.7)
ALP SERPL-CCNC: 71 U/L (ref 45–117)
ALP SERPL-CCNC: 75 U/L (ref 45–117)
ALP SERPL-CCNC: 77 U/L (ref 45–117)
ALP SERPL-CCNC: 87 U/L (ref 45–117)
ALP SERPL-CCNC: 89 U/L (ref 45–117)
ALT SERPL-CCNC: 14 U/L (ref 13–56)
ALT SERPL-CCNC: 14 U/L (ref 13–56)
ALT SERPL-CCNC: 19 U/L (ref 13–56)
ALT SERPL-CCNC: 20 U/L (ref 13–56)
ALT SERPL-CCNC: 27 U/L (ref 13–56)
ANION GAP SERPL CALC-SCNC: 1 MMOL/L (ref 3–18)
ANION GAP SERPL CALC-SCNC: 11 MMOL/L (ref 3–18)
ANION GAP SERPL CALC-SCNC: 14 MMOL/L (ref 3–18)
ANION GAP SERPL CALC-SCNC: 3 MMOL/L (ref 3–18)
ANION GAP SERPL CALC-SCNC: 6 MMOL/L (ref 3–18)
ANION GAP SERPL CALC-SCNC: 6 MMOL/L (ref 3–18)
ANION GAP SERPL CALC-SCNC: 7 MMOL/L (ref 3–18)
ANION GAP SERPL CALC-SCNC: 8 MMOL/L (ref 3–18)
APPEARANCE UR: ABNORMAL
APPEARANCE UR: ABNORMAL
AST SERPL-CCNC: 24 U/L (ref 10–38)
AST SERPL-CCNC: 25 U/L (ref 10–38)
AST SERPL-CCNC: 32 U/L (ref 10–38)
AST SERPL-CCNC: 37 U/L (ref 10–38)
AST SERPL-CCNC: 39 U/L (ref 10–38)
ATRIAL RATE: 118 BPM
ATRIAL RATE: 94 BPM
BACTERIA SPEC CULT: ABNORMAL
BACTERIA URNS QL MICRO: ABNORMAL /HPF
BACTERIA URNS QL MICRO: ABNORMAL /HPF
BASOPHILS # BLD: 0 K/UL (ref 0–0.1)
BASOPHILS NFR BLD: 0 % (ref 0–2)
BASOPHILS NFR BLD: 1 % (ref 0–2)
BILIRUB SERPL-MCNC: 0.4 MG/DL (ref 0.2–1)
BILIRUB SERPL-MCNC: 0.4 MG/DL (ref 0.2–1)
BILIRUB SERPL-MCNC: 0.5 MG/DL (ref 0.2–1)
BILIRUB SERPL-MCNC: 0.5 MG/DL (ref 0.2–1)
BILIRUB SERPL-MCNC: 0.7 MG/DL (ref 0.2–1)
BILIRUB UR QL: ABNORMAL
BILIRUB UR QL: NEGATIVE
BUN SERPL-MCNC: 22 MG/DL (ref 7–18)
BUN SERPL-MCNC: 27 MG/DL (ref 7–18)
BUN SERPL-MCNC: 36 MG/DL (ref 7–18)
BUN SERPL-MCNC: 39 MG/DL (ref 7–18)
BUN SERPL-MCNC: 39 MG/DL (ref 7–18)
BUN SERPL-MCNC: 41 MG/DL (ref 7–18)
BUN SERPL-MCNC: 49 MG/DL (ref 7–18)
BUN SERPL-MCNC: 64 MG/DL (ref 7–18)
BUN SERPL-MCNC: 89 MG/DL (ref 7–18)
BUN SERPL-MCNC: 90 MG/DL (ref 7–18)
BUN/CREAT SERPL: 23 (ref 12–20)
BUN/CREAT SERPL: 25 (ref 12–20)
BUN/CREAT SERPL: 27 (ref 12–20)
BUN/CREAT SERPL: 30 (ref 12–20)
BUN/CREAT SERPL: 32 (ref 12–20)
BUN/CREAT SERPL: 32 (ref 12–20)
BUN/CREAT SERPL: 33 (ref 12–20)
BUN/CREAT SERPL: 35 (ref 12–20)
BUN/CREAT SERPL: 36 (ref 12–20)
BUN/CREAT SERPL: 36 (ref 12–20)
CALCIUM SERPL-MCNC: 8.4 MG/DL (ref 8.5–10.1)
CALCIUM SERPL-MCNC: 8.5 MG/DL (ref 8.5–10.1)
CALCIUM SERPL-MCNC: 8.9 MG/DL (ref 8.5–10.1)
CALCIUM SERPL-MCNC: 9 MG/DL (ref 8.5–10.1)
CALCIUM SERPL-MCNC: 9.1 MG/DL (ref 8.5–10.1)
CALCIUM SERPL-MCNC: 9.2 MG/DL (ref 8.5–10.1)
CALCIUM SERPL-MCNC: 9.4 MG/DL (ref 8.5–10.1)
CALCIUM SERPL-MCNC: 9.8 MG/DL (ref 8.5–10.1)
CALCULATED R AXIS, ECG10: 79 DEGREES
CALCULATED R AXIS, ECG10: 92 DEGREES
CALCULATED T AXIS, ECG11: -90 DEGREES
CALCULATED T AXIS, ECG11: 38 DEGREES
CC UR VC: ABNORMAL
CC UR VC: ABNORMAL
CHLORIDE SERPL-SCNC: 106 MMOL/L (ref 100–111)
CHLORIDE SERPL-SCNC: 107 MMOL/L (ref 100–111)
CHLORIDE SERPL-SCNC: 109 MMOL/L (ref 100–111)
CHLORIDE SERPL-SCNC: 121 MMOL/L (ref 100–111)
CHLORIDE SERPL-SCNC: 122 MMOL/L (ref 100–111)
CHLORIDE SERPL-SCNC: 122 MMOL/L (ref 100–111)
CHLORIDE SERPL-SCNC: 123 MMOL/L (ref 100–111)
CHLORIDE SERPL-SCNC: 124 MMOL/L (ref 100–111)
CK MB CFR SERPL CALC: 1.5 % (ref 0–4)
CK MB CFR SERPL CALC: 2.3 % (ref 0–4)
CK MB SERPL-MCNC: 4.2 NG/ML (ref 5–25)
CK MB SERPL-MCNC: 4.9 NG/ML (ref 5–25)
CK SERPL-CCNC: 217 U/L (ref 26–192)
CK SERPL-CCNC: 273 U/L (ref 26–192)
CO2 SERPL-SCNC: 19 MMOL/L (ref 21–32)
CO2 SERPL-SCNC: 26 MMOL/L (ref 21–32)
CO2 SERPL-SCNC: 28 MMOL/L (ref 21–32)
CO2 SERPL-SCNC: 28 MMOL/L (ref 21–32)
CO2 SERPL-SCNC: 29 MMOL/L (ref 21–32)
CO2 SERPL-SCNC: 30 MMOL/L (ref 21–32)
CO2 SERPL-SCNC: 35 MMOL/L (ref 21–32)
CO2 SERPL-SCNC: 35 MMOL/L (ref 21–32)
COLOR UR: ABNORMAL
COLOR UR: ABNORMAL
COVID-19 RAPID TEST, COVR: NOT DETECTED
CREAT SERPL-MCNC: 0.96 MG/DL (ref 0.6–1.3)
CREAT SERPL-MCNC: 1.06 MG/DL (ref 0.6–1.3)
CREAT SERPL-MCNC: 1.08 MG/DL (ref 0.6–1.3)
CREAT SERPL-MCNC: 1.14 MG/DL (ref 0.6–1.3)
CREAT SERPL-MCNC: 1.14 MG/DL (ref 0.6–1.3)
CREAT SERPL-MCNC: 1.4 MG/DL (ref 0.6–1.3)
CREAT SERPL-MCNC: 1.44 MG/DL (ref 0.6–1.3)
CREAT SERPL-MCNC: 1.96 MG/DL (ref 0.6–1.3)
CREAT SERPL-MCNC: 2.81 MG/DL (ref 0.6–1.3)
CREAT SERPL-MCNC: 2.99 MG/DL (ref 0.6–1.3)
DIAGNOSIS, 93000: NORMAL
DIAGNOSIS, 93000: NORMAL
DIFFERENTIAL METHOD BLD: ABNORMAL
ECHO AO ROOT DIAM: 3.02 CM
ECHO LA AREA 4C: 11.67 CM2
ECHO LA VOL 2C: 20.74 ML (ref 22–52)
ECHO LA VOL 4C: 26.35 ML (ref 22–52)
ECHO LA VOL BP: 21.38 ML (ref 22–52)
ECHO LA VOL BP: 27.98 ML (ref 22–52)
ECHO LA VOLUME INDEX A2C: 14.65 ML/M2 (ref 16–28)
ECHO LA VOLUME INDEX A4C: 18.61 ML/M2 (ref 16–28)
ECHO LV INTERNAL DIMENSION DIASTOLIC: 2.69 CM (ref 3.9–5.3)
ECHO LV INTERNAL DIMENSION SYSTOLIC: 2.34 CM
ECHO LV IVSD: 1.18 CM (ref 0.6–0.9)
ECHO LV MASS 2D: 93.5 G (ref 67–162)
ECHO LV MASS INDEX 2D: 66 G/M2 (ref 43–95)
ECHO LV POSTERIOR WALL DIASTOLIC: 1.21 CM (ref 0.6–0.9)
ECHO LVOT DIAM: 2 CM
ECHO LVOT PEAK GRADIENT: 10.18 MMHG
ECHO LVOT PEAK VELOCITY: 159.51 CM/S
ECHO LVOT SV: 80.8 ML
ECHO LVOT VTI: 25.84 CM
ECHO TV REGURGITANT MAX VELOCITY: 250.15 CM/S
ECHO TV REGURGITANT PEAK GRADIENT: 25.03 MMHG
EOSINOPHIL # BLD: 0 K/UL (ref 0–0.4)
EOSINOPHIL # BLD: 0 K/UL (ref 0–0.4)
EOSINOPHIL # BLD: 0.1 K/UL (ref 0–0.4)
EOSINOPHIL # BLD: 0.2 K/UL (ref 0–0.4)
EOSINOPHIL # BLD: 0.2 K/UL (ref 0–0.4)
EOSINOPHIL # BLD: 0.3 K/UL (ref 0–0.4)
EOSINOPHIL # BLD: 0.3 K/UL (ref 0–0.4)
EOSINOPHIL NFR BLD: 0 % (ref 0–5)
EOSINOPHIL NFR BLD: 1 % (ref 0–5)
EOSINOPHIL NFR BLD: 1 % (ref 0–5)
EOSINOPHIL NFR BLD: 4 % (ref 0–5)
EOSINOPHIL NFR BLD: 4 % (ref 0–5)
EOSINOPHIL NFR BLD: 5 % (ref 0–5)
EOSINOPHIL NFR BLD: 6 % (ref 0–5)
EPITH CASTS URNS QL MICRO: ABNORMAL /LPF (ref 0–5)
EPITH CASTS URNS QL MICRO: ABNORMAL /LPF (ref 0–5)
ERYTHROCYTE [DISTWIDTH] IN BLOOD BY AUTOMATED COUNT: 12.8 % (ref 11.6–14.5)
ERYTHROCYTE [DISTWIDTH] IN BLOOD BY AUTOMATED COUNT: 13.1 % (ref 11.6–14.5)
ERYTHROCYTE [DISTWIDTH] IN BLOOD BY AUTOMATED COUNT: 13.3 % (ref 11.6–14.5)
ERYTHROCYTE [DISTWIDTH] IN BLOOD BY AUTOMATED COUNT: 14.2 % (ref 11.6–14.5)
ERYTHROCYTE [DISTWIDTH] IN BLOOD BY AUTOMATED COUNT: 14.5 % (ref 11.6–14.5)
ERYTHROCYTE [DISTWIDTH] IN BLOOD BY AUTOMATED COUNT: 14.6 % (ref 11.6–14.5)
ERYTHROCYTE [DISTWIDTH] IN BLOOD BY AUTOMATED COUNT: 15.1 % (ref 11.6–14.5)
GLOBULIN SER CALC-MCNC: 2.4 G/DL (ref 2–4)
GLOBULIN SER CALC-MCNC: 3 G/DL (ref 2–4)
GLOBULIN SER CALC-MCNC: 3.3 G/DL (ref 2–4)
GLOBULIN SER CALC-MCNC: 3.4 G/DL (ref 2–4)
GLOBULIN SER CALC-MCNC: 4.1 G/DL (ref 2–4)
GLUCOSE SERPL-MCNC: 101 MG/DL (ref 74–99)
GLUCOSE SERPL-MCNC: 105 MG/DL (ref 74–99)
GLUCOSE SERPL-MCNC: 119 MG/DL (ref 74–99)
GLUCOSE SERPL-MCNC: 134 MG/DL (ref 74–99)
GLUCOSE SERPL-MCNC: 141 MG/DL (ref 74–99)
GLUCOSE SERPL-MCNC: 162 MG/DL (ref 74–99)
GLUCOSE SERPL-MCNC: 213 MG/DL (ref 74–99)
GLUCOSE SERPL-MCNC: 91 MG/DL (ref 74–99)
GLUCOSE SERPL-MCNC: 95 MG/DL (ref 74–99)
GLUCOSE SERPL-MCNC: 98 MG/DL (ref 74–99)
GLUCOSE UR STRIP.AUTO-MCNC: NEGATIVE MG/DL
GLUCOSE UR STRIP.AUTO-MCNC: NEGATIVE MG/DL
HCT VFR BLD AUTO: 32 % (ref 35–45)
HCT VFR BLD AUTO: 33.3 % (ref 35–45)
HCT VFR BLD AUTO: 34.6 % (ref 35–45)
HCT VFR BLD AUTO: 34.6 % (ref 35–45)
HCT VFR BLD AUTO: 36 % (ref 35–45)
HCT VFR BLD AUTO: 37 % (ref 35–45)
HCT VFR BLD AUTO: 37 % (ref 35–45)
HCT VFR BLD AUTO: 43.4 % (ref 35–45)
HCT VFR BLD AUTO: 43.6 % (ref 35–45)
HGB BLD-MCNC: 10.3 G/DL (ref 12–16)
HGB BLD-MCNC: 10.6 G/DL (ref 12–16)
HGB BLD-MCNC: 11.1 G/DL (ref 12–16)
HGB BLD-MCNC: 11.2 G/DL (ref 12–16)
HGB BLD-MCNC: 12.1 G/DL (ref 12–16)
HGB BLD-MCNC: 12.2 G/DL (ref 12–16)
HGB BLD-MCNC: 12.4 G/DL (ref 12–16)
HGB BLD-MCNC: 13.9 G/DL (ref 12–16)
HGB BLD-MCNC: 14.2 G/DL (ref 12–16)
HGB UR QL STRIP: ABNORMAL
HGB UR QL STRIP: ABNORMAL
INR PPP: 0.9 (ref 0.8–1.2)
KETONES UR QL STRIP.AUTO: ABNORMAL MG/DL
KETONES UR QL STRIP.AUTO: ABNORMAL MG/DL
LEUKOCYTE ESTERASE UR QL STRIP.AUTO: ABNORMAL
LEUKOCYTE ESTERASE UR QL STRIP.AUTO: ABNORMAL
LVOT MG: 4.09 MMHG
LYMPHOCYTES # BLD: 1 K/UL (ref 0.9–3.6)
LYMPHOCYTES # BLD: 1 K/UL (ref 0.9–3.6)
LYMPHOCYTES # BLD: 1.1 K/UL (ref 0.9–3.6)
LYMPHOCYTES # BLD: 1.4 K/UL (ref 0.9–3.6)
LYMPHOCYTES # BLD: 1.5 K/UL (ref 0.9–3.6)
LYMPHOCYTES # BLD: 1.5 K/UL (ref 0.9–3.6)
LYMPHOCYTES # BLD: 1.8 K/UL (ref 0.9–3.6)
LYMPHOCYTES NFR BLD: 15 % (ref 21–52)
LYMPHOCYTES NFR BLD: 22 % (ref 21–52)
LYMPHOCYTES NFR BLD: 22 % (ref 21–52)
LYMPHOCYTES NFR BLD: 27 % (ref 21–52)
LYMPHOCYTES NFR BLD: 29 % (ref 21–52)
LYMPHOCYTES NFR BLD: 29 % (ref 21–52)
LYMPHOCYTES NFR BLD: 30 % (ref 21–52)
MCH RBC QN AUTO: 30.7 PG (ref 24–34)
MCH RBC QN AUTO: 31.1 PG (ref 24–34)
MCH RBC QN AUTO: 31.2 PG (ref 24–34)
MCH RBC QN AUTO: 31.4 PG (ref 24–34)
MCH RBC QN AUTO: 31.6 PG (ref 24–34)
MCH RBC QN AUTO: 31.7 PG (ref 24–34)
MCH RBC QN AUTO: 32.3 PG (ref 24–34)
MCH RBC QN AUTO: 32.4 PG (ref 24–34)
MCH RBC QN AUTO: 32.4 PG (ref 24–34)
MCHC RBC AUTO-ENTMCNC: 31.8 G/DL (ref 31–37)
MCHC RBC AUTO-ENTMCNC: 31.9 G/DL (ref 31–37)
MCHC RBC AUTO-ENTMCNC: 32.1 G/DL (ref 31–37)
MCHC RBC AUTO-ENTMCNC: 32.2 G/DL (ref 31–37)
MCHC RBC AUTO-ENTMCNC: 32.4 G/DL (ref 31–37)
MCHC RBC AUTO-ENTMCNC: 32.7 G/DL (ref 31–37)
MCHC RBC AUTO-ENTMCNC: 32.7 G/DL (ref 31–37)
MCHC RBC AUTO-ENTMCNC: 33.5 G/DL (ref 31–37)
MCHC RBC AUTO-ENTMCNC: 33.9 G/DL (ref 31–37)
MCV RBC AUTO: 95.5 FL (ref 74–97)
MCV RBC AUTO: 95.7 FL (ref 74–97)
MCV RBC AUTO: 96.1 FL (ref 74–97)
MCV RBC AUTO: 96.6 FL (ref 74–97)
MCV RBC AUTO: 96.6 FL (ref 74–97)
MCV RBC AUTO: 97.2 FL (ref 74–97)
MCV RBC AUTO: 98.5 FL (ref 74–97)
MCV RBC AUTO: 98.6 FL (ref 74–97)
MCV RBC AUTO: 99.3 FL (ref 74–97)
MONOCYTES # BLD: 0.5 K/UL (ref 0.05–1.2)
MONOCYTES # BLD: 0.5 K/UL (ref 0.05–1.2)
MONOCYTES # BLD: 0.6 K/UL (ref 0.05–1.2)
MONOCYTES # BLD: 0.6 K/UL (ref 0.05–1.2)
MONOCYTES # BLD: 0.7 K/UL (ref 0.05–1.2)
MONOCYTES NFR BLD: 10 % (ref 3–10)
MONOCYTES NFR BLD: 11 % (ref 3–10)
MONOCYTES NFR BLD: 11 % (ref 3–10)
MONOCYTES NFR BLD: 12 % (ref 3–10)
MONOCYTES NFR BLD: 13 % (ref 3–10)
MONOCYTES NFR BLD: 17 % (ref 3–10)
MONOCYTES NFR BLD: 9 % (ref 3–10)
NEUTS SEG # BLD: 1.5 K/UL (ref 1.8–8)
NEUTS SEG # BLD: 2.5 K/UL (ref 1.8–8)
NEUTS SEG # BLD: 3 K/UL (ref 1.8–8)
NEUTS SEG # BLD: 3.3 K/UL (ref 1.8–8)
NEUTS SEG # BLD: 3.9 K/UL (ref 1.8–8)
NEUTS SEG # BLD: 4.2 K/UL (ref 1.8–8)
NEUTS SEG # BLD: 5 K/UL (ref 1.8–8)
NEUTS SEG NFR BLD: 47 % (ref 40–73)
NEUTS SEG NFR BLD: 54 % (ref 40–73)
NEUTS SEG NFR BLD: 55 % (ref 40–73)
NEUTS SEG NFR BLD: 60 % (ref 40–73)
NEUTS SEG NFR BLD: 62 % (ref 40–73)
NEUTS SEG NFR BLD: 65 % (ref 40–73)
NEUTS SEG NFR BLD: 75 % (ref 40–73)
NITRITE UR QL STRIP.AUTO: NEGATIVE
NITRITE UR QL STRIP.AUTO: NEGATIVE
P-R INTERVAL, ECG05: 120 MS
PH UR STRIP: 5 [PH] (ref 5–8)
PH UR STRIP: 5 [PH] (ref 5–8)
PLATELET # BLD AUTO: 127 K/UL (ref 135–420)
PLATELET # BLD AUTO: 154 K/UL (ref 135–420)
PLATELET # BLD AUTO: 180 K/UL (ref 135–420)
PLATELET # BLD AUTO: 186 K/UL (ref 135–420)
PLATELET # BLD AUTO: 214 K/UL (ref 135–420)
PLATELET # BLD AUTO: 217 K/UL (ref 135–420)
PLATELET # BLD AUTO: 225 K/UL (ref 135–420)
PLATELET # BLD AUTO: 226 K/UL (ref 135–420)
PLATELET # BLD AUTO: 265 K/UL (ref 135–420)
PLATELET COMMENTS,PCOM: ABNORMAL
PLATELET COMMENTS,PCOM: ABNORMAL
PMV BLD AUTO: 10.4 FL (ref 9.2–11.8)
PMV BLD AUTO: 10.7 FL (ref 9.2–11.8)
PMV BLD AUTO: 11.3 FL (ref 9.2–11.8)
PMV BLD AUTO: 12.5 FL (ref 9.2–11.8)
PMV BLD AUTO: 12.6 FL (ref 9.2–11.8)
PMV BLD AUTO: 12.7 FL (ref 9.2–11.8)
PMV BLD AUTO: 12.8 FL (ref 9.2–11.8)
PMV BLD AUTO: 12.9 FL (ref 9.2–11.8)
PMV BLD AUTO: 13.9 FL (ref 9.2–11.8)
POTASSIUM SERPL-SCNC: 2.7 MMOL/L (ref 3.5–5.5)
POTASSIUM SERPL-SCNC: 2.7 MMOL/L (ref 3.5–5.5)
POTASSIUM SERPL-SCNC: 3.3 MMOL/L (ref 3.5–5.5)
POTASSIUM SERPL-SCNC: 3.3 MMOL/L (ref 3.5–5.5)
POTASSIUM SERPL-SCNC: 3.4 MMOL/L (ref 3.5–5.5)
POTASSIUM SERPL-SCNC: 3.5 MMOL/L (ref 3.5–5.5)
POTASSIUM SERPL-SCNC: 3.5 MMOL/L (ref 3.5–5.5)
POTASSIUM SERPL-SCNC: 3.6 MMOL/L (ref 3.5–5.5)
POTASSIUM SERPL-SCNC: 3.8 MMOL/L (ref 3.5–5.5)
POTASSIUM SERPL-SCNC: 4 MMOL/L (ref 3.5–5.5)
PROT SERPL-MCNC: 4.2 G/DL (ref 6.4–8.2)
PROT SERPL-MCNC: 5.4 G/DL (ref 6.4–8.2)
PROT SERPL-MCNC: 5.5 G/DL (ref 6.4–8.2)
PROT SERPL-MCNC: 5.5 G/DL (ref 6.4–8.2)
PROT SERPL-MCNC: 6.7 G/DL (ref 6.4–8.2)
PROT UR STRIP-MCNC: 30 MG/DL
PROT UR STRIP-MCNC: ABNORMAL MG/DL
PROTHROMBIN TIME: 12.4 SEC (ref 11.5–15.2)
Q-T INTERVAL, ECG07: 364 MS
Q-T INTERVAL, ECG07: 438 MS
QRS DURATION, ECG06: 110 MS
QRS DURATION, ECG06: 130 MS
QTC CALCULATION (BEZET), ECG08: 510 MS
QTC CALCULATION (BEZET), ECG08: 575 MS
RBC # BLD AUTO: 3.35 M/UL (ref 4.2–5.3)
RBC # BLD AUTO: 3.38 M/UL (ref 4.2–5.3)
RBC # BLD AUTO: 3.56 M/UL (ref 4.2–5.3)
RBC # BLD AUTO: 3.6 M/UL (ref 4.2–5.3)
RBC # BLD AUTO: 3.76 M/UL (ref 4.2–5.3)
RBC # BLD AUTO: 3.83 M/UL (ref 4.2–5.3)
RBC # BLD AUTO: 3.83 M/UL (ref 4.2–5.3)
RBC # BLD AUTO: 4.39 M/UL (ref 4.2–5.3)
RBC # BLD AUTO: 4.4 M/UL (ref 4.2–5.3)
RBC #/AREA URNS HPF: ABNORMAL /HPF (ref 0–5)
RBC #/AREA URNS HPF: ABNORMAL /HPF (ref 0–5)
RBC MORPH BLD: ABNORMAL
SARS-COV-2, COV2NT: NOT DETECTED
SERVICE CMNT-IMP: ABNORMAL
SERVICE CMNT-IMP: ABNORMAL
SODIUM SERPL-SCNC: 144 MMOL/L (ref 136–145)
SODIUM SERPL-SCNC: 144 MMOL/L (ref 136–145)
SODIUM SERPL-SCNC: 145 MMOL/L (ref 136–145)
SODIUM SERPL-SCNC: 154 MMOL/L (ref 136–145)
SODIUM SERPL-SCNC: 155 MMOL/L (ref 136–145)
SODIUM SERPL-SCNC: 156 MMOL/L (ref 136–145)
SODIUM SERPL-SCNC: 156 MMOL/L (ref 136–145)
SODIUM SERPL-SCNC: 158 MMOL/L (ref 136–145)
SODIUM SERPL-SCNC: 160 MMOL/L (ref 136–145)
SODIUM SERPL-SCNC: 164 MMOL/L (ref 136–145)
SOURCE, COVRS: NORMAL
SP GR UR REFRACTOMETRY: 1.02 (ref 1–1.03)
SP GR UR REFRACTOMETRY: 1.02 (ref 1–1.03)
SPECIMEN TYPE, XMCV1T: NORMAL
TROPONIN I SERPL-MCNC: 0.28 NG/ML (ref 0–0.04)
TROPONIN I SERPL-MCNC: 0.28 NG/ML (ref 0–0.04)
TROPONIN I SERPL-MCNC: 0.3 NG/ML (ref 0–0.04)
TROPONIN I SERPL-MCNC: 0.32 NG/ML (ref 0–0.04)
TROPONIN I SERPL-MCNC: 0.32 NG/ML (ref 0–0.04)
UROBILINOGEN UR QL STRIP.AUTO: 1 EU/DL (ref 0.2–1)
UROBILINOGEN UR QL STRIP.AUTO: 1 EU/DL (ref 0.2–1)
VENTRICULAR RATE, ECG03: 104 BPM
VENTRICULAR RATE, ECG03: 118 BPM
WBC # BLD AUTO: 3.3 K/UL (ref 4.6–13.2)
WBC # BLD AUTO: 4.6 K/UL (ref 4.6–13.2)
WBC # BLD AUTO: 4.6 K/UL (ref 4.6–13.2)
WBC # BLD AUTO: 5 K/UL (ref 4.6–13.2)
WBC # BLD AUTO: 5.1 K/UL (ref 4.6–13.2)
WBC # BLD AUTO: 5.4 K/UL (ref 4.6–13.2)
WBC # BLD AUTO: 6.2 K/UL (ref 4.6–13.2)
WBC # BLD AUTO: 6.7 K/UL (ref 4.6–13.2)
WBC # BLD AUTO: 6.7 K/UL (ref 4.6–13.2)
WBC URNS QL MICRO: ABNORMAL /HPF (ref 0–4)
WBC URNS QL MICRO: ABNORMAL /HPF (ref 0–4)

## 2020-01-01 PROCEDURE — C1887 CATHETER, GUIDING: HCPCS | Performed by: SURGERY

## 2020-01-01 PROCEDURE — 74011250636 HC RX REV CODE- 250/636: Performed by: INTERNAL MEDICINE

## 2020-01-01 PROCEDURE — G0299 HHS/HOSPICE OF RN EA 15 MIN: HCPCS

## 2020-01-01 PROCEDURE — 80048 BASIC METABOLIC PNL TOTAL CA: CPT

## 2020-01-01 PROCEDURE — 74011250637 HC RX REV CODE- 250/637: Performed by: PHYSICIAN ASSISTANT

## 2020-01-01 PROCEDURE — C1724 CATH, TRANS ATHEREC,ROTATION: HCPCS | Performed by: SURGERY

## 2020-01-01 PROCEDURE — 0651 HSPC ROUTINE HOME CARE

## 2020-01-01 PROCEDURE — 87635 SARS-COV-2 COVID-19 AMP PRB: CPT

## 2020-01-01 PROCEDURE — 77030013519 HC DEV INFL BASIX MRTM -B: Performed by: SURGERY

## 2020-01-01 PROCEDURE — C1725 CATH, TRANSLUMIN NON-LASER: HCPCS | Performed by: SURGERY

## 2020-01-01 PROCEDURE — 85025 COMPLETE CBC W/AUTO DIFF WBC: CPT

## 2020-01-01 PROCEDURE — 2709999900 HC NON-CHARGEABLE SUPPLY

## 2020-01-01 PROCEDURE — C1769 GUIDE WIRE: HCPCS | Performed by: SURGERY

## 2020-01-01 PROCEDURE — 51798 US URINE CAPACITY MEASURE: CPT

## 2020-01-01 PROCEDURE — 82550 ASSAY OF CK (CPK): CPT

## 2020-01-01 PROCEDURE — 74011250636 HC RX REV CODE- 250/636: Performed by: NURSE ANESTHETIST, CERTIFIED REGISTERED

## 2020-01-01 PROCEDURE — 76450000000

## 2020-01-01 PROCEDURE — 74011000250 HC RX REV CODE- 250: Performed by: EMERGENCY MEDICINE

## 2020-01-01 PROCEDURE — 76937 US GUIDE VASCULAR ACCESS: CPT | Performed by: SURGERY

## 2020-01-01 PROCEDURE — 93306 TTE W/DOPPLER COMPLETE: CPT

## 2020-01-01 PROCEDURE — 76060000034 HC ANESTHESIA 1.5 TO 2 HR: Performed by: SURGERY

## 2020-01-01 PROCEDURE — 80053 COMPREHEN METABOLIC PANEL: CPT

## 2020-01-01 PROCEDURE — 74011250636 HC RX REV CODE- 250/636: Performed by: HOSPITALIST

## 2020-01-01 PROCEDURE — 77030013744: Performed by: SURGERY

## 2020-01-01 PROCEDURE — C1894 INTRO/SHEATH, NON-LASER: HCPCS | Performed by: SURGERY

## 2020-01-01 PROCEDURE — 84484 ASSAY OF TROPONIN QUANT: CPT

## 2020-01-01 PROCEDURE — 93005 ELECTROCARDIOGRAM TRACING: CPT

## 2020-01-01 PROCEDURE — 87086 URINE CULTURE/COLONY COUNT: CPT

## 2020-01-01 PROCEDURE — 74011250637 HC RX REV CODE- 250/637: Performed by: HOSPITALIST

## 2020-01-01 PROCEDURE — G0156 HHCP-SVS OF AIDE,EA 15 MIN: HCPCS

## 2020-01-01 PROCEDURE — 74011250637 HC RX REV CODE- 250/637: Performed by: SURGERY

## 2020-01-01 PROCEDURE — 99231 SBSQ HOSP IP/OBS SF/LOW 25: CPT | Performed by: NURSE PRACTITIONER

## 2020-01-01 PROCEDURE — 3336500001 HSPC ELECTION

## 2020-01-01 PROCEDURE — 65660000000 HC RM CCU STEPDOWN

## 2020-01-01 PROCEDURE — 77030004530 HC CATH ANGI DX IMGR BSC -A: Performed by: SURGERY

## 2020-01-01 PROCEDURE — 51701 INSERT BLADDER CATHETER: CPT

## 2020-01-01 PROCEDURE — 85027 COMPLETE CBC AUTOMATED: CPT

## 2020-01-01 PROCEDURE — 75710 ARTERY X-RAYS ARM/LEG: CPT | Performed by: SURGERY

## 2020-01-01 PROCEDURE — 74011000258 HC RX REV CODE- 258: Performed by: NURSE ANESTHETIST, CERTIFIED REGISTERED

## 2020-01-01 PROCEDURE — 71045 X-RAY EXAM CHEST 1 VIEW: CPT

## 2020-01-01 PROCEDURE — 77030004565 HC CATH ANGI DX TMPO CARD -B: Performed by: SURGERY

## 2020-01-01 PROCEDURE — 99232 SBSQ HOSP IP/OBS MODERATE 35: CPT | Performed by: INTERNAL MEDICINE

## 2020-01-01 PROCEDURE — 74011000250 HC RX REV CODE- 250: Performed by: SURGERY

## 2020-01-01 PROCEDURE — 99232 SBSQ HOSP IP/OBS MODERATE 35: CPT | Performed by: HOSPITALIST

## 2020-01-01 PROCEDURE — 99285 EMERGENCY DEPT VISIT HI MDM: CPT

## 2020-01-01 PROCEDURE — C1760 CLOSURE DEV, VASC: HCPCS | Performed by: SURGERY

## 2020-01-01 PROCEDURE — 99223 1ST HOSP IP/OBS HIGH 75: CPT | Performed by: NURSE PRACTITIONER

## 2020-01-01 PROCEDURE — 99223 1ST HOSP IP/OBS HIGH 75: CPT | Performed by: HOSPITALIST

## 2020-01-01 PROCEDURE — 87186 SC STD MICRODIL/AGAR DIL: CPT

## 2020-01-01 PROCEDURE — 96361 HYDRATE IV INFUSION ADD-ON: CPT

## 2020-01-01 PROCEDURE — 99232 SBSQ HOSP IP/OBS MODERATE 35: CPT | Performed by: PHYSICIAN ASSISTANT

## 2020-01-01 PROCEDURE — 77030018865 HC TRNSDCR PRSS MON EDWD -A: Performed by: SURGERY

## 2020-01-01 PROCEDURE — 74011000250 HC RX REV CODE- 250: Performed by: HOSPITALIST

## 2020-01-01 PROCEDURE — T4541 LARGE DISPOSABLE UNDERPAD: HCPCS

## 2020-01-01 PROCEDURE — 74011000258 HC RX REV CODE- 258: Performed by: HOSPITALIST

## 2020-01-01 PROCEDURE — 36415 COLL VENOUS BLD VENIPUNCTURE: CPT

## 2020-01-01 PROCEDURE — 87077 CULTURE AEROBIC IDENTIFY: CPT

## 2020-01-01 PROCEDURE — 74011636320 HC RX REV CODE- 636/320: Performed by: SURGERY

## 2020-01-01 PROCEDURE — 81001 URINALYSIS AUTO W/SCOPE: CPT

## 2020-01-01 PROCEDURE — 75625 CONTRAST EXAM ABDOMINL AORTA: CPT | Performed by: SURGERY

## 2020-01-01 PROCEDURE — 37225 HC ARTHERC FEMPOP UNI +/-PTA: CPT | Performed by: SURGERY

## 2020-01-01 PROCEDURE — 99239 HOSP IP/OBS DSCHRG MGMT >30: CPT | Performed by: HOSPITALIST

## 2020-01-01 PROCEDURE — 74011250637 HC RX REV CODE- 250/637: Performed by: EMERGENCY MEDICINE

## 2020-01-01 PROCEDURE — 99223 1ST HOSP IP/OBS HIGH 75: CPT | Performed by: INTERNAL MEDICINE

## 2020-01-01 PROCEDURE — 74011250636 HC RX REV CODE- 250/636: Performed by: EMERGENCY MEDICINE

## 2020-01-01 PROCEDURE — T4522 ADULT SIZE BRIEF/DIAPER MED: HCPCS

## 2020-01-01 PROCEDURE — 77030008584 HC TOOL GDWRE DEV TERU -A: Performed by: SURGERY

## 2020-01-01 PROCEDURE — G0155 HHCP-SVS OF CSW,EA 15 MIN: HCPCS

## 2020-01-01 PROCEDURE — 96374 THER/PROPH/DIAG INJ IV PUSH: CPT

## 2020-01-01 PROCEDURE — 85610 PROTHROMBIN TIME: CPT

## 2020-01-01 PROCEDURE — 77030028790 HC ACC ST CTRL VLV COPLT ABBT -B: Performed by: SURGERY

## 2020-01-01 RX ORDER — VERAPAMIL HYDROCHLORIDE 2.5 MG/ML
INJECTION, SOLUTION INTRAVENOUS AS NEEDED
Status: DISCONTINUED | OUTPATIENT
Start: 2020-01-01 | End: 2020-01-01 | Stop reason: HOSPADM

## 2020-01-01 RX ORDER — PANTOPRAZOLE SODIUM 20 MG/1
20 TABLET, DELAYED RELEASE ORAL
Status: DISCONTINUED | OUTPATIENT
Start: 2020-01-01 | End: 2020-01-01 | Stop reason: HOSPADM

## 2020-01-01 RX ORDER — CLOPIDOGREL BISULFATE 75 MG/1
TABLET ORAL
Qty: 120 TAB | Refills: 0 | Status: SHIPPED | OUTPATIENT
Start: 2020-01-01 | End: 2020-01-01 | Stop reason: SDUPTHER

## 2020-01-01 RX ORDER — DEXTROSE MONOHYDRATE AND SODIUM CHLORIDE 5; .45 G/100ML; G/100ML
75 INJECTION, SOLUTION INTRAVENOUS CONTINUOUS
Status: DISCONTINUED | OUTPATIENT
Start: 2020-01-01 | End: 2020-01-01

## 2020-01-01 RX ORDER — LIDOCAINE HYDROCHLORIDE 10 MG/ML
INJECTION, SOLUTION EPIDURAL; INFILTRATION; INTRACAUDAL; PERINEURAL AS NEEDED
Status: DISCONTINUED | OUTPATIENT
Start: 2020-01-01 | End: 2020-01-01 | Stop reason: HOSPADM

## 2020-01-01 RX ORDER — ASPIRIN 81 MG/1
81 TABLET ORAL DAILY
Status: DISCONTINUED | OUTPATIENT
Start: 2020-01-01 | End: 2020-01-01 | Stop reason: HOSPADM

## 2020-01-01 RX ORDER — PROPOFOL 10 MG/ML
INJECTION, EMULSION INTRAVENOUS AS NEEDED
Status: DISCONTINUED | OUTPATIENT
Start: 2020-01-01 | End: 2020-01-01 | Stop reason: HOSPADM

## 2020-01-01 RX ORDER — DULOXETIN HYDROCHLORIDE 20 MG/1
20 CAPSULE, DELAYED RELEASE ORAL DAILY
Status: DISCONTINUED | OUTPATIENT
Start: 2020-01-01 | End: 2020-01-01 | Stop reason: HOSPADM

## 2020-01-01 RX ORDER — POTASSIUM CHLORIDE 7.45 MG/ML
10 INJECTION INTRAVENOUS
Status: COMPLETED | OUTPATIENT
Start: 2020-01-01 | End: 2020-01-01

## 2020-01-01 RX ORDER — SODIUM CHLORIDE 0.9 % (FLUSH) 0.9 %
5-40 SYRINGE (ML) INJECTION AS NEEDED
Status: CANCELLED | OUTPATIENT
Start: 2020-01-01

## 2020-01-01 RX ORDER — DEXTROSE MONOHYDRATE 50 MG/ML
100 INJECTION, SOLUTION INTRAVENOUS CONTINUOUS
Status: DISCONTINUED | OUTPATIENT
Start: 2020-01-01 | End: 2020-01-01 | Stop reason: HOSPADM

## 2020-01-01 RX ORDER — ONDANSETRON 2 MG/ML
4 INJECTION INTRAMUSCULAR; INTRAVENOUS ONCE
Status: CANCELLED | OUTPATIENT
Start: 2020-01-01 | End: 2020-01-01

## 2020-01-01 RX ORDER — SODIUM CHLORIDE 9 MG/ML
75 INJECTION, SOLUTION INTRAVENOUS CONTINUOUS
Status: DISCONTINUED | OUTPATIENT
Start: 2020-01-01 | End: 2020-01-01

## 2020-01-01 RX ORDER — METOPROLOL TARTRATE 25 MG/1
25 TABLET, FILM COATED ORAL EVERY 12 HOURS
Status: DISCONTINUED | OUTPATIENT
Start: 2020-01-01 | End: 2020-01-01 | Stop reason: HOSPADM

## 2020-01-01 RX ORDER — MEMANTINE HYDROCHLORIDE 10 MG/1
10 TABLET ORAL DAILY
Status: DISCONTINUED | OUTPATIENT
Start: 2020-01-01 | End: 2020-01-01 | Stop reason: HOSPADM

## 2020-01-01 RX ORDER — SODIUM CHLORIDE 0.9 % (FLUSH) 0.9 %
5-40 SYRINGE (ML) INJECTION EVERY 8 HOURS
Status: DISCONTINUED | OUTPATIENT
Start: 2020-01-01 | End: 2020-01-01 | Stop reason: HOSPADM

## 2020-01-01 RX ORDER — FENTANYL CITRATE 50 UG/ML
INJECTION, SOLUTION INTRAMUSCULAR; INTRAVENOUS
Status: COMPLETED
Start: 2020-01-01 | End: 2020-01-01

## 2020-01-01 RX ORDER — DEXTROSE 50 % IN WATER (D50W) INTRAVENOUS SYRINGE
25-50 AS NEEDED
Status: CANCELLED | OUTPATIENT
Start: 2020-01-01

## 2020-01-01 RX ORDER — SODIUM CHLORIDE, SODIUM LACTATE, POTASSIUM CHLORIDE, CALCIUM CHLORIDE 600; 310; 30; 20 MG/100ML; MG/100ML; MG/100ML; MG/100ML
75 INJECTION, SOLUTION INTRAVENOUS CONTINUOUS
Status: CANCELLED | OUTPATIENT
Start: 2020-01-01

## 2020-01-01 RX ORDER — ONDANSETRON 4 MG/1
4 TABLET, ORALLY DISINTEGRATING ORAL
Status: DISCONTINUED | OUTPATIENT
Start: 2020-01-01 | End: 2020-01-01 | Stop reason: HOSPADM

## 2020-01-01 RX ORDER — MAGNESIUM SULFATE 100 %
4 CRYSTALS MISCELLANEOUS AS NEEDED
Status: CANCELLED | OUTPATIENT
Start: 2020-01-01

## 2020-01-01 RX ORDER — CLOPIDOGREL BISULFATE 75 MG/1
75 TABLET ORAL DAILY
Qty: 30 TAB | Refills: 3 | Status: SHIPPED | OUTPATIENT
Start: 2020-01-01 | End: 2020-01-01

## 2020-01-01 RX ORDER — DORZOLAMIDE HCL 20 MG/ML
1 SOLUTION/ DROPS OPHTHALMIC 3 TIMES DAILY
Status: DISCONTINUED | OUTPATIENT
Start: 2020-01-01 | End: 2020-01-01 | Stop reason: HOSPADM

## 2020-01-01 RX ORDER — CIPROFLOXACIN 500 MG/1
500 TABLET ORAL
Status: DISCONTINUED | OUTPATIENT
Start: 2020-01-01 | End: 2020-01-01 | Stop reason: HOSPADM

## 2020-01-01 RX ORDER — IODIXANOL 320 MG/ML
INJECTION, SOLUTION INTRAVASCULAR AS NEEDED
Status: DISCONTINUED | OUTPATIENT
Start: 2020-01-01 | End: 2020-01-01 | Stop reason: HOSPADM

## 2020-01-01 RX ORDER — LORAZEPAM 2 MG/ML
1 INJECTION INTRAMUSCULAR
Status: DISCONTINUED | OUTPATIENT
Start: 2020-01-01 | End: 2020-01-01 | Stop reason: HOSPADM

## 2020-01-01 RX ORDER — CILOSTAZOL 50 MG/1
100 TABLET ORAL
Status: DISCONTINUED | OUTPATIENT
Start: 2020-01-01 | End: 2020-01-01 | Stop reason: HOSPADM

## 2020-01-01 RX ORDER — HEPARIN SODIUM 5000 [USP'U]/ML
5000 INJECTION, SOLUTION INTRAVENOUS; SUBCUTANEOUS EVERY 12 HOURS
Status: DISCONTINUED | OUTPATIENT
Start: 2020-01-01 | End: 2020-01-01 | Stop reason: HOSPADM

## 2020-01-01 RX ORDER — SODIUM CHLORIDE 0.9 % (FLUSH) 0.9 %
5-40 SYRINGE (ML) INJECTION AS NEEDED
Status: DISCONTINUED | OUTPATIENT
Start: 2020-01-01 | End: 2020-01-01 | Stop reason: HOSPADM

## 2020-01-01 RX ORDER — CLOPIDOGREL BISULFATE 75 MG/1
75 TABLET ORAL DAILY
Qty: 90 TAB | Refills: 3 | Status: SHIPPED | OUTPATIENT
Start: 2020-01-01

## 2020-01-01 RX ORDER — CIPROFLOXACIN 500 MG/1
500 TABLET ORAL
Qty: 8 TAB | Refills: 0 | Status: SHIPPED
Start: 2020-01-01 | End: 2020-01-01

## 2020-01-01 RX ORDER — SODIUM CHLORIDE 9 MG/ML
INJECTION, SOLUTION INTRAVENOUS
Status: DISCONTINUED | OUTPATIENT
Start: 2020-01-01 | End: 2020-01-01 | Stop reason: HOSPADM

## 2020-01-01 RX ORDER — HEPARIN SODIUM 1000 [USP'U]/ML
INJECTION, SOLUTION INTRAVENOUS; SUBCUTANEOUS AS NEEDED
Status: DISCONTINUED | OUTPATIENT
Start: 2020-01-01 | End: 2020-01-01 | Stop reason: HOSPADM

## 2020-01-01 RX ORDER — PROPOFOL 10 MG/ML
VIAL (ML) INTRAVENOUS
Status: DISCONTINUED | OUTPATIENT
Start: 2020-01-01 | End: 2020-01-01 | Stop reason: HOSPADM

## 2020-01-01 RX ORDER — FENTANYL CITRATE 50 UG/ML
INJECTION, SOLUTION INTRAMUSCULAR; INTRAVENOUS AS NEEDED
Status: DISCONTINUED | OUTPATIENT
Start: 2020-01-01 | End: 2020-01-01 | Stop reason: HOSPADM

## 2020-01-01 RX ORDER — SODIUM CHLORIDE 0.9 % (FLUSH) 0.9 %
5-40 SYRINGE (ML) INJECTION EVERY 8 HOURS
Status: CANCELLED | OUTPATIENT
Start: 2020-01-01

## 2020-01-01 RX ORDER — CLOPIDOGREL BISULFATE 75 MG/1
150 TABLET ORAL ONCE
Status: COMPLETED | OUTPATIENT
Start: 2020-01-01 | End: 2020-01-01

## 2020-01-01 RX ORDER — CLOPIDOGREL BISULFATE 75 MG/1
75 TABLET ORAL DAILY
Status: DISCONTINUED | OUTPATIENT
Start: 2020-01-01 | End: 2020-01-01 | Stop reason: HOSPADM

## 2020-01-01 RX ORDER — METOPROLOL TARTRATE 25 MG/1
25 TABLET, FILM COATED ORAL EVERY 12 HOURS
Qty: 30 TAB | Refills: 0 | Status: SHIPPED
Start: 2020-01-01

## 2020-01-01 RX ORDER — NITROGLYCERIN 40 MG/100ML
INJECTION INTRAVENOUS
Status: COMPLETED | OUTPATIENT
Start: 2020-01-01 | End: 2020-01-01

## 2020-01-01 RX ORDER — FENTANYL CITRATE 50 UG/ML
25 INJECTION, SOLUTION INTRAMUSCULAR; INTRAVENOUS AS NEEDED
Status: CANCELLED | OUTPATIENT
Start: 2020-01-01

## 2020-01-01 RX ORDER — SODIUM CHLORIDE, SODIUM LACTATE, POTASSIUM CHLORIDE, CALCIUM CHLORIDE 600; 310; 30; 20 MG/100ML; MG/100ML; MG/100ML; MG/100ML
75 INJECTION, SOLUTION INTRAVENOUS CONTINUOUS
Status: DISCONTINUED | OUTPATIENT
Start: 2020-01-01 | End: 2020-01-01 | Stop reason: HOSPADM

## 2020-01-01 RX ORDER — ATORVASTATIN CALCIUM 10 MG/1
10 TABLET, FILM COATED ORAL DAILY
Status: DISCONTINUED | OUTPATIENT
Start: 2020-01-01 | End: 2020-01-01 | Stop reason: HOSPADM

## 2020-01-01 RX ORDER — TIMOLOL MALEATE 5 MG/ML
1 SOLUTION/ DROPS OPHTHALMIC 2 TIMES DAILY
Status: DISCONTINUED | OUTPATIENT
Start: 2020-01-01 | End: 2020-01-01 | Stop reason: HOSPADM

## 2020-01-01 RX ORDER — AMLODIPINE BESYLATE 10 MG/1
10 TABLET ORAL DAILY
Status: DISCONTINUED | OUTPATIENT
Start: 2020-01-01 | End: 2020-01-01

## 2020-01-01 RX ORDER — ONDANSETRON 2 MG/ML
4 INJECTION INTRAMUSCULAR; INTRAVENOUS
Status: DISCONTINUED | OUTPATIENT
Start: 2020-01-01 | End: 2020-01-01 | Stop reason: HOSPADM

## 2020-01-01 RX ORDER — POLYETHYLENE GLYCOL 3350 17 G/17G
17 POWDER, FOR SOLUTION ORAL DAILY PRN
Status: DISCONTINUED | OUTPATIENT
Start: 2020-01-01 | End: 2020-01-01 | Stop reason: HOSPADM

## 2020-01-01 RX ADMIN — FENTANYL CITRATE 12.5 MCG: 50 INJECTION, SOLUTION INTRAMUSCULAR; INTRAVENOUS at 10:25

## 2020-01-01 RX ADMIN — CILOSTAZOL 100 MG: 50 TABLET ORAL at 09:57

## 2020-01-01 RX ADMIN — DORZOLAMIDE HYDROCHLORIDE 1 DROP: 20 SOLUTION/ DROPS OPHTHALMIC at 23:01

## 2020-01-01 RX ADMIN — METOPROLOL TARTRATE 25 MG: 25 TABLET, FILM COATED ORAL at 23:01

## 2020-01-01 RX ADMIN — CLOPIDOGREL BISULFATE 75 MG: 75 TABLET ORAL at 10:48

## 2020-01-01 RX ADMIN — CIPROFLOXACIN 500 MG: 500 TABLET, FILM COATED ORAL at 14:30

## 2020-01-01 RX ADMIN — POTASSIUM BICARBONATE 40 MEQ: 782 TABLET, EFFERVESCENT ORAL at 10:03

## 2020-01-01 RX ADMIN — Medication 81 MG: at 09:57

## 2020-01-01 RX ADMIN — Medication 81 MG: at 14:51

## 2020-01-01 RX ADMIN — POTASSIUM CHLORIDE 10 MEQ: 7.46 INJECTION, SOLUTION INTRAVENOUS at 10:00

## 2020-01-01 RX ADMIN — HEPARIN SODIUM 5000 UNITS: 5000 INJECTION INTRAVENOUS; SUBCUTANEOUS at 06:19

## 2020-01-01 RX ADMIN — SODIUM CHLORIDE: 900 INJECTION, SOLUTION INTRAVENOUS at 09:30

## 2020-01-01 RX ADMIN — DEXTROSE MONOHYDRATE 75 ML/HR: 5 INJECTION, SOLUTION INTRAVENOUS at 03:40

## 2020-01-01 RX ADMIN — DEXTROSE MONOHYDRATE 75 ML/HR: 5 INJECTION, SOLUTION INTRAVENOUS at 12:44

## 2020-01-01 RX ADMIN — FENTANYL CITRATE 12.5 MCG: 50 INJECTION, SOLUTION INTRAMUSCULAR; INTRAVENOUS at 09:39

## 2020-01-01 RX ADMIN — HEPARIN SODIUM 5000 UNITS: 5000 INJECTION INTRAVENOUS; SUBCUTANEOUS at 14:49

## 2020-01-01 RX ADMIN — PROPOFOL 75 MCG/KG/MIN: 10 INJECTION, EMULSION INTRAVENOUS at 09:42

## 2020-01-01 RX ADMIN — LORAZEPAM 1 MG: 2 INJECTION INTRAMUSCULAR; INTRAVENOUS at 09:55

## 2020-01-01 RX ADMIN — HEPARIN SODIUM 5000 UNITS: 1000 INJECTION, SOLUTION INTRAVENOUS; SUBCUTANEOUS at 10:10

## 2020-01-01 RX ADMIN — TIMOLOL MALEATE 1 DROP: 5 SOLUTION OPHTHALMIC at 23:02

## 2020-01-01 RX ADMIN — PANTOPRAZOLE SODIUM 20 MG: 20 TABLET, DELAYED RELEASE ORAL at 09:57

## 2020-01-01 RX ADMIN — METOPROLOL TARTRATE 25 MG: 25 TABLET, FILM COATED ORAL at 10:48

## 2020-01-01 RX ADMIN — CLOPIDOGREL BISULFATE 150 MG: 75 TABLET ORAL at 14:32

## 2020-01-01 RX ADMIN — MEMANTINE HYDROCHLORIDE 10 MG: 10 TABLET ORAL at 10:48

## 2020-01-01 RX ADMIN — POTASSIUM CHLORIDE 10 MEQ: 7.46 INJECTION, SOLUTION INTRAVENOUS at 11:00

## 2020-01-01 RX ADMIN — CEFEPIME 2 G: 2 INJECTION, POWDER, FOR SOLUTION INTRAVENOUS at 18:23

## 2020-01-01 RX ADMIN — HEPARIN SODIUM 6000 UNITS: 1000 INJECTION, SOLUTION INTRAVENOUS; SUBCUTANEOUS at 11:48

## 2020-01-01 RX ADMIN — LORAZEPAM 1 MG: 2 INJECTION INTRAMUSCULAR; INTRAVENOUS at 15:04

## 2020-01-01 RX ADMIN — HEPARIN SODIUM 5000 UNITS: 5000 INJECTION INTRAVENOUS; SUBCUTANEOUS at 14:31

## 2020-01-01 RX ADMIN — CILOSTAZOL 100 MG: 50 TABLET ORAL at 06:34

## 2020-01-01 RX ADMIN — SODIUM CHLORIDE 1000 ML: 900 INJECTION, SOLUTION INTRAVENOUS at 18:23

## 2020-01-01 RX ADMIN — Medication 81 MG: at 10:48

## 2020-01-01 RX ADMIN — DEXTROSE MONOHYDRATE AND SODIUM CHLORIDE 75 ML/HR: 5; .45 INJECTION, SOLUTION INTRAVENOUS at 02:45

## 2020-01-01 RX ADMIN — DULOXETINE HYDROCHLORIDE 20 MG: 20 CAPSULE, DELAYED RELEASE ORAL at 10:48

## 2020-01-01 RX ADMIN — SODIUM CHLORIDE 75 ML/HR: 900 INJECTION, SOLUTION INTRAVENOUS at 20:48

## 2020-01-01 RX ADMIN — SODIUM CHLORIDE: 900 INJECTION, SOLUTION INTRAVENOUS at 10:46

## 2020-01-01 RX ADMIN — CEFTRIAXONE SODIUM 1 G: 1 INJECTION, POWDER, FOR SOLUTION INTRAMUSCULAR; INTRAVENOUS at 17:03

## 2020-01-01 RX ADMIN — ATORVASTATIN CALCIUM 10 MG: 10 TABLET, FILM COATED ORAL at 10:48

## 2020-01-01 RX ADMIN — METOPROLOL TARTRATE 25 MG: 25 TABLET, FILM COATED ORAL at 09:57

## 2020-01-01 RX ADMIN — CIPROFLOXACIN 500 MG: 500 TABLET, FILM COATED ORAL at 09:57

## 2020-01-01 RX ADMIN — METOPROLOL TARTRATE 25 MG: 25 TABLET, FILM COATED ORAL at 22:41

## 2020-01-01 RX ADMIN — PROPOFOL 10 MG: 10 INJECTION, EMULSION INTRAVENOUS at 09:50

## 2020-01-01 RX ADMIN — PROPOFOL 50 MCG/KG/MIN: 10 INJECTION, EMULSION INTRAVENOUS at 10:59

## 2020-01-01 RX ADMIN — CEFTRIAXONE SODIUM 1 G: 1 INJECTION, POWDER, FOR SOLUTION INTRAMUSCULAR; INTRAVENOUS at 18:59

## 2020-01-01 RX ADMIN — PANTOPRAZOLE SODIUM 20 MG: 20 TABLET, DELAYED RELEASE ORAL at 06:34

## 2020-01-01 RX ADMIN — FENTANYL CITRATE 12.5 MCG: 50 INJECTION, SOLUTION INTRAMUSCULAR; INTRAVENOUS at 09:58

## 2020-01-01 RX ADMIN — LORAZEPAM 1 MG: 2 INJECTION INTRAMUSCULAR; INTRAVENOUS at 13:56

## 2020-01-01 RX ADMIN — CLOPIDOGREL BISULFATE 75 MG: 75 TABLET ORAL at 09:57

## 2020-01-01 RX ADMIN — ATORVASTATIN CALCIUM 10 MG: 10 TABLET, FILM COATED ORAL at 09:57

## 2020-01-01 RX ADMIN — PROPOFOL 10 MG: 10 INJECTION, EMULSION INTRAVENOUS at 10:24

## 2020-01-24 NOTE — PROGRESS NOTES
1. Have you been to an emergency room or urgent care clinic since your last visit? No 
 
Hospitalized since your last visit? If yes, where, when, and reason for visit? NO 
2. Have you seen or consulted any other health care providers outside of the [facility] since your last visit including any procedures, health maintenance items. If yes, where, when and reason for visit?  No

## 2020-01-24 NOTE — PROGRESS NOTES
Yolanda Rivera Chief Complaint Patient presents with  Leg Pain  Swelling  Wound Check History and Physical   
70-year-old female here in follow-up today regarding her peripheral artery disease and right leg pain. Her pain is becoming escalated she is also developed some ulcers on the right foot. She is sent back by her foot doctor for possibility of angiography. Past Medical History:  
Diagnosis Date  Glaucoma  Heart abnormality   
 arrythmia  Hypertension  Reflux  Short leg syndrome, right, acquired Patient Active Problem List  
Diagnosis Code  
 HTN (hypertension) I10  
 Lipid disorder E78.9  Glaucoma H40.9  GERD (gastroesophageal reflux disease) K21.9  
 PAD (peripheral artery disease) ([facility]) I73.9  Toe deformity M20.60 Past Surgical History:  
Procedure Laterality Date  HX HEENT  VASCULAR SURGERY PROCEDURE UNLIST Current Outpatient Medications Medication Sig Dispense Refill  lidocaine-prilocaine (EMLA) topical cream Apply  to affected area as needed for Pain. 30 g 3  
 cilostazol (PLETAL) 100 mg tablet take 1 tablet by mouth BEFORE BREAKFAST AND DINNER 60 Tab 3  
 omeprazole (PRILOSEC) 20 mg capsule Take 20 mg by mouth daily.  hydroCHLOROthiazide (HYDRODIURIL) 25 mg tablet Take 25 mg by mouth daily.  metoprolol tartrate (LOPRESSOR) 50 mg tablet Take  by mouth two (2) times a day.  memantine (NAMENDA) 10 mg tablet Take  by mouth daily.  QUEtiapine (SEROQUEL) 50 mg tablet Take 50 mg by mouth two (2) times a day.  cilostazol (PLETAL) 100 mg tablet Take 1 Tab by mouth Before breakfast and dinner. 60 Tab 3  
 aspirin delayed-release 81 mg tablet Take  by mouth daily.  magnesium citrate solution Take 296 mL by mouth now.  metoclopramide HCl (REGLAN) 10 mg tablet Take 10 mg by mouth Before breakfast, lunch, dinner and at bedtime.  senna (SENOKOT) 8.6 mg tablet Take 1 Tab by mouth daily.  traMADol (ULTRAM) 50 mg tablet Take 50 mg by mouth every six (6) hours as needed for Pain.  atorvastatin (LIPITOR) 10 mg tablet Take  by mouth daily.  meloxicam (MOBIC) 7.5 mg tablet Take  by mouth daily.  polyethylene glycol (MIRALAX) 17 gram packet Take 17 g by mouth daily.  meclizine (ANTIVERT) 25 mg tablet Take  by mouth three (3) times daily as needed.  amLODIPine (NORVASC) 10 mg tablet Take  by mouth daily.  RABEprazole (ACIPHEX) 20 mg tablet Take 20 mg by mouth daily.  therapeutic multivitamin (THERAGRAN) tablet Take 1 Tab by mouth daily.  Calcium Gluconate 60 mg (648 mg) tab Take  by mouth.  levobunolol (BETAGAN) 0.5 % ophthalmic solution Administer 1 Drop to both eyes nightly.  brinzolamide (AZOPT) 1 % ophthalmic suspension Administer 1 Drop to both eyes three (3) times daily. Allergies Allergen Reactions  Tylenol [Acetaminophen] Other (comments) GI distress Review of Systems A full review of systems was completed times ten organ systems and was deemed negative unless otherwise mentioned in the HPI. Physical  
Visit Vitals /70 (BP 1 Location: Left arm, BP Patient Position: Sitting) Resp 17 Ht 5' 5\" (1.651 m) Wt 98 lb (44.5 kg) BMI 16.31 kg/m² She is alert and in no distress Head is normocephalic Neck no JVD Chest clear Cardiac regular Abdomen soft flat nontender Nonpalpable pedal pulses she does have foot deformity and there is a ulcer on the big toe on the right and her right foot is painful. Impression/Plan: ICD-10-CM ICD-9-CM 1. PAD (peripheral artery disease) ([facility]) I73.9 443.9 2. Right leg pain M79.604 729.5 3. Skin ulcer of right foot, limited to breakdown of skin ([facility] [address] L97.511 707.15 She has had known PAD but now has escalating symptoms despite best medical therapy We talked about risks and benefits of angiography but they are agreeable to proceed with any diagnostic angiogram.  Help with is a opportunity to treat with a minimally invasive technique. No orders of the defined types were placed in this encounter. Darren Lee MD 
 
PLEASE NOTE: 
This document has been produced using voice recognition software. Unrecognized errors in transcription may be present.

## 2020-02-04 NOTE — H&P
History and Physical   
77-year-old female here in follow-up today regarding her peripheral artery disease and right leg pain. Her pain is becoming escalated she is also developed some ulcers on the right foot. She is sent back by her foot doctor for possibility of angiography. 
  
    
Past Medical History:  
Diagnosis Date  Glaucoma    
 Heart abnormality    
  arrythmia  Hypertension    
 Reflux    
 Short leg syndrome, right, acquired    
  
    
Patient Active Problem List  
Diagnosis Code  
 HTN (hypertension) I10  
 Lipid disorder E78.9  Glaucoma H40.9  GERD (gastroesophageal reflux disease) K21.9  
 PAD (peripheral artery disease) ([facility]) I73.9  Toe deformity M20.60  
  
     
Past Surgical History:  
Procedure Laterality Date  HX HEENT      
 VASCULAR SURGERY PROCEDURE UNLIST      
  
      
Current Outpatient Medications Medication Sig Dispense Refill  lidocaine-prilocaine (EMLA) topical cream Apply  to affected area as needed for Pain. 30 g 3  
 cilostazol (PLETAL) 100 mg tablet take 1 tablet by mouth BEFORE BREAKFAST AND DINNER 60 Tab 3  
 omeprazole (PRILOSEC) 20 mg capsule Take 20 mg by mouth daily.      
 hydroCHLOROthiazide (HYDRODIURIL) 25 mg tablet Take 25 mg by mouth daily.      
 metoprolol tartrate (LOPRESSOR) 50 mg tablet Take  by mouth two (2) times a day.      
 memantine (NAMENDA) 10 mg tablet Take  by mouth daily.      
 QUEtiapine (SEROQUEL) 50 mg tablet Take 50 mg by mouth two (2) times a day.      
 cilostazol (PLETAL) 100 mg tablet Take 1 Tab by mouth Before breakfast and dinner.  60 Tab 3  
 aspirin delayed-release 81 mg tablet Take  by mouth daily.      
 magnesium citrate solution Take 296 mL by mouth now.      
 metoclopramide HCl (REGLAN) 10 mg tablet Take 10 mg by mouth Before breakfast, lunch, dinner and at bedtime.      
 senna (SENOKOT) 8.6 mg tablet Take 1 Tab by mouth daily.      
  traMADol (ULTRAM) 50 mg tablet Take 50 mg by mouth every six (6) hours as needed for Pain.      
 atorvastatin (LIPITOR) 10 mg tablet Take  by mouth daily.      
 meloxicam (MOBIC) 7.5 mg tablet Take  by mouth daily.      
 polyethylene glycol (MIRALAX) 17 gram packet Take 17 g by mouth daily.      
 meclizine (ANTIVERT) 25 mg tablet Take  by mouth three (3) times daily as needed.      
 amLODIPine (NORVASC) 10 mg tablet Take  by mouth daily.      
 RABEprazole (ACIPHEX) 20 mg tablet Take 20 mg by mouth daily.      
 therapeutic multivitamin (THERAGRAN) tablet Take 1 Tab by mouth daily.      
 Calcium Gluconate 60 mg (648 mg) tab Take  by mouth.      
 levobunolol (BETAGAN) 0.5 % ophthalmic solution Administer 1 Drop to both eyes nightly.      
 brinzolamide (AZOPT) 1 % ophthalmic suspension Administer 1 Drop to both eyes three (3) times daily.      
  
     
Allergies Allergen Reactions  Tylenol [Acetaminophen] Other (comments)  
    GI distress  
  
  
Review of Systems A full review of systems was completed times ten organ systems and was deemed negative unless otherwise mentioned in the HPI. 
  
Physical  
Visit Vitals /70 (BP 1 Location: Left arm, BP Patient Position: Sitting) Resp 17 Ht 5' 5\" (1.651 m) Wt 98 lb (44.5 kg) BMI 16.31 kg/m²  
  
  
She is alert and in no distress Head is normocephalic Neck no JVD Chest clear Cardiac regular Abdomen soft flat nontender Nonpalpable pedal pulses she does have foot deformity and there is a ulcer on the big toe on the right and her right foot is painful. Impression/Plan:  
    ICD-10-CM ICD-9-CM    
1. PAD (peripheral artery disease) ([facility]) I73.9 443. 9    
2. Right leg pain M79.604 729. 5    
3. Skin ulcer of right foot, limited to breakdown of skin [facility]) L97.511 707.15    
She has had known PAD but now has escalating symptoms despite best medical therapy We talked about risks and benefits of angiography but they are agreeable to proceed with any diagnostic angiogram.  Help with is a opportunity to treat with a minimally invasive technique.

## 2020-02-05 NOTE — Clinical Note
TRANSFER - OUT REPORT:     Verbal report given to: RICKI SANTORO. Report consisted of patient's Situation, Background, Assessment and   Recommendations(SBAR). Opportunity for questions and clarification was provided. Patient transported with a Cardiac Cath Tech / Patient Care Tech. Patient transported to: [address].

## 2020-02-05 NOTE — Clinical Note
Power injection to the artery. Single view taken. PSI = 1000. Rate of rise = 0.5 sec. Injection rate = 10 mL/sec. Total injected volume = 20 mL.  ABDOMINAL AORTOGRAM

## 2020-02-05 NOTE — Clinical Note
Peripheral Lesion 2. Balloon inflated using single inflation technique. Pressure = 20 karen; Duration = 180 sec.  POP

## 2020-02-05 NOTE — Clinical Note
Bilateral groin prepped with ChloraPrep and draped. Wet prep solution applied at: 1102. Wet prep solution dried at: 1105. Wet prep elapsed drying time: 3 mins.

## 2020-02-05 NOTE — Clinical Note
Peripheral Lesion 2, atherectomy performed: rotational. Pass RPM = 120. Duration = 12 sec. Pass # = 3.  SFA / POP

## 2020-02-05 NOTE — Clinical Note
TRANSFER - IN REPORT:     Verbal report received from: [facility] [place] RN. Report consisted of patient's Situation, Background, Assessment and   Recommendations(SBAR). Opportunity for questions and clarification was provided. Assessment completed upon patient's arrival to unit and care assumed. Patient transported with a Cardiac Cath Tech / Patient Care Tech.

## 2020-02-05 NOTE — Clinical Note
Peripheral Lesion 2. Balloon inflated using single inflation technique. Pressure = 10 karen; Duration = 180 sec.  SFA/ POP

## 2020-02-05 NOTE — Clinical Note
Peripheral Lesion 2. Balloon balloon re-inflated. Pressure = 14 karen; Duration = 180 sec.  SFA / POP

## 2020-02-05 NOTE — Clinical Note
Peripheral Lesion 1, atherectomy performed: rotational. Pass RPM = 60. Duration = 20 sec.  Pass # = 1. 1.5 SOLID    SFA

## 2020-02-05 NOTE — PROGRESS NOTES
Cath holding summary Patient escorted to cath holding from waiting area ambulatory, alert and oriented x 4, voicing no complaints. Changed into gown and placed on monitor. NPO since MN. Lab results, med rec and H&P reviewed on chart. PIV x 1 inserted without difficulty. Family to bedside. [address] TRANSFER - OUT REPORT: 
 
Verbal report given to Ana(name) on Dana Vargas  being transferred to cath lab(unit) for ordered procedure Report consisted of patients Situation, Background, Assessment and  
Recommendations(SBAR). Information from the following report(s) SBAR, MAR and Pre Procedure Checklist was reviewed with the receiving nurse. Lines:    
 
Opportunity for questions and clarification was provided. Patient transported with: 
 Tech  
 
[phone] TRANSFER - IN REPORT: 
 
Verbal report received from Hoang(name) on Dana Vargas  being received from cath lab(unit) for routine post - op Report consisted of patients Situation, Background, Assessment and  
Recommendations(SBAR). Information from the following report(s) SBAR, Procedure Summary and MAR was reviewed with the receiving nurse. Opportunity for questions and clarification was provided. Assessment completed upon patients arrival to unit and care assumed. Dressing to left groin angioseal. Dressing clean dry an intact. [address] [employer] brought to bedside. Vitaliy Pt and caregiver provided discharge instructions. All questions answered and pt verbalized understanding. PIV removed. No hematoma or bleeding noted at this time. Procedure site within normal limits. Pt escorted to front of building for discharge.

## 2020-02-05 NOTE — Clinical Note
Peripheral Lesion 1, atherectomy performed: rotational. Pass RPM = 120. Duration = 14 sec. Pass # = 3.  SFA

## 2020-02-05 NOTE — Clinical Note
Sheath #2: Sheath: inserted. Sheath inserted/placed in the left femoral  artery. Hemostasis achieved.

## 2020-02-05 NOTE — OP NOTES
Preoperative diagnosis: Peripheral artery disease with pain and ulceration right foot, SFA popliteal occlusion, tibial vessel occlusion. Postoperative diagnosis: Same Procedures performed: #1  Ultrasound of left femoral artery with interpretation #2  Percutaneous access left femoral artery placement catheter to aorta, aortic angiogram with interpretation. Placement of catheter to right common femoral right leg angiogram with interpretation. Placement of catheter to right popliteal artery below the knee popliteal and tibial angiogram with interpretation #3  Atherectomy of right SFA and popliteal artery with associated intra-interpretation #4  Balloon angioplasty of right popliteal and superficial femoral artery with angiogram and interpretation, sculpting balloon right popliteal artery with associated Nate Peak interpretation Cultures: None Specimens: None Drains: None Estimated blood loss: Less than 50 mL Assistants: None Implants: Please see above Complications: None Anesthesia: Moderate conscious sedation provided by anesthesia Indications for the procedure: 
Cody Cochran is a 80 y. o. with chronic PAD now with escalating symptoms including pain and ulceration on right foot and toes. .  Patient was given the appropriate risk and benefits of the procedure including but not limited to bleeding, infection, damage to adjacent structures, MI, stroke, death, loss of lower extremity, need for further surgery. Patient was understanding of all the risks and underwent a procedure. Operative findings:  
#1  Aortogram reveals patent aorta with atherosclerosis but patent, renal arteries patent. Iliac arteries reduced diffusely atherosclerotic tortuous but patent. Right leg: Common femoral is patent profundus patent. The SFA at the midportion and through the popliteal has multiple tandem critical stenosis.   The tibial vessels have tried vessel occlusion at the proximal location with some reconstitution distally but extensive collateralization has formed. Successful atherectomy and angioplasty of right SFA and popliteal. 
 
Procedure: 
Patient was correctly identified in the precath area and taken to the Cath Lab in stable condition. Patient had pre-incision timeout prior to any incision. Patient was prepped and draped in the normal sterile fashion according to [facility] guidelines aseptic technique. Ultrasound was done of the left femoral artery images were stored for PACS it appeared normal did a single anterior puncture into the common femoral placed a wire into the aorta. Placed a 4 Malay sheath and a flush catheter of note there was significant tortuosity making catheter passes very difficult. Did aortogram rewired the catheter placed the catheter up and over to the right side using his catheter combined with a Glidewire certainly this very difficult signs of placing a stiff wire and the sheath up and over into the common femoral.  Through the sheath I did the right leg angiogram.  Placed a Glidewire across the SFA and popliteal down to the distal popliteal confirming placed the catheter here confirming was intraluminal which I was. Place a Viper wire anticoagulated with 6000 of heparin. Then did a 1.5 solid atherectomy of the SFA and popliteal.  This followed by 4 mm balloon to the popliteal and SFA. There is an area of resistance I did 3 x 40 angioscope to a pressure of 20. The final angiogram shows a wonderfully patent SFA and popliteal with no dissection no extravasation. Tibials were enhanced I tried no one 8 Glidewire advantage but could not get through these tibial vessels a very long time chronic heavily calcific occlusions. Still the outcome is good to think there is enhanced flow. Pulled the sheath back in over the Bentson wire now and did a Angio-Seal closure without complication. We will place her on Plavix therapy.

## 2020-02-05 NOTE — ANESTHESIA PREPROCEDURE EVALUATION
Relevant Problems No relevant active problems Anesthetic History Review of Systems / Medical History Patient summary reviewed and pertinent labs reviewed Pulmonary Within defined limits Neuro/Psych Within defined limits Comments: Alzheimer's disease Cardiovascular Hypertension: well controlled PAD Exercise tolerance: <4 METS 
  
GI/Hepatic/Renal 
  
GERD: well controlled Endo/Other Other Findings Physical Exam 
 
Airway Mallampati: II 
TM Distance: 4 - 6 cm Neck ROM: normal range of motion Mouth opening: Normal 
 
 Cardiovascular Regular rate and rhythm,  S1 and S2 normal,  no murmur, click, rub, or gallop Dental 
 
Dentition: Full upper dentures Pulmonary Breath sounds clear to auscultation Abdominal 
GI exam deferred Other Findings Anesthetic Plan ASA: 3 Anesthesia type: MAC Induction: Intravenous Anesthetic plan and risks discussed with: Healthcare power of  History and consent obtained from patient's niece (POA)

## 2020-02-05 NOTE — Clinical Note
Contrast Dose Calculator:   Patient's age: 80.   Patient's sex: Female. Patient weight (kg) = 49.7. Creatinine level (mg/dL) = 1.06.   Creatinine clearance (mL/min): 25.   Contrast concentration (mg/mL) = 320. MACD = 219.78 mL. Max Contrast dose per Creatinine Cl calculator = 56.25 mL.

## 2020-02-05 NOTE — DISCHARGE INSTRUCTIONS
HEART CATHETERIZATION/ANGIOGRAPHY DISCHARGE INSTRUCTIONS    1. Check puncture site frequently for swelling or bleeding. If there is any bleeding, lie down and apply pressure over the area with a clean towel or washcloth. Notify your doctor for any redness, swelling, drainage, or oozing from the puncture site. Notify your doctor for any fever or chills. 2. If the extremity becomes cold, numb, or painful go to the Emergency Room. 3. Activity should be limited for the next 48 hours. Climb stairs as little as possible and avoid any stooping, bending, or strenuous activity for 48 hours. No heavy lifting (anything over 8 pounds) for 5 days. 4. You may resume your usual diet. Drink more fluids than usual.  5. Have a responsible person drive you home and stay with you for at least 24 hours after your heart catheterization/angiography. 6. You may remove bandage from your Groin in 24 hours. You may shower in 24 hours. No tub baths, hot tubs, or swimming for 1 week. Do not place any lotions, creams, powders, or ointments over puncture site for 1 week. You may place a clean band-aid over the puncture site each day for 5 days. Change daily. 7. If you take Metformin, do not take it for 48 hours. 8. Ask your nurse when to restart any blood thinners. How can you care for yourself at home? Activity  · Do not do strenuous exercise and do not lift, pull, or push anything heavy until your doctor says it is okay. This may be for a day or two. You can walk around the house and do light activity, such as cooking. · You may shower 24 to 48 hours after the procedure, if your doctor okays it. Pat the incision dry. Do not take a bath for 1 week, or until your doctor tells you it is okay. · If the catheter was placed in your groin, try not to walk up stairs for the first couple of days. · If the catheter was placed in your arm near your wrist, do not bend your wrist deeply for the first couple of days.  Be careful using your hand to get into and out of a chair or bed. · If your doctor recommends it, get more exercise. Walking is a good choice. Bit by bit, increase the amount you walk every day. Try for at least 30 minutes on most days of the week. Diet  · Drink plenty of fluids to help your body flush out the dye. If you have kidney, heart, or liver disease and have to limit fluids, talk with your doctor before you increase the amount of fluids you drink. · Keep eating a heart-healthy diet that has lots of fruits, vegetables, and whole grains. If you have not been eating this way, talk to your doctor. You also may want to talk to a dietitian. This expert can help you to learn about healthy foods and plan meals. Medicines  · Your doctor will tell you if and when you can restart your medicines. He or she will also give you instructions about taking any new medicines. · If you take blood thinners, such as warfarin (Coumadin), clopidogrel (Plavix), or aspirin, be sure to talk to your doctor. He or she will tell you if and when to start taking those medicines again. Make sure that you understand exactly what your doctor wants you to do. · Your doctor may prescribe a blood-thinning medicine like aspirin or clopidogrel (Plavix). It is very important that you take these medicines exactly as directed in order to keep the coronary artery open and reduce your risk of a heart attack. Be safe with medicines. Call your doctor if you think you are having a problem with your medicine. Care of the catheter site  · For the first 3 days, keep a bandage over the spot where the catheter was inserted. · Put ice or a cold pack on the area for 10 to 20 minutes at a time to help with soreness or swelling. Put a thin cloth between the ice and your skin. Sedation for a Medical Procedure: Care Instructions  Your Care Instructions  For a minor procedure or surgery, you will get a sedative to help you relax. This drug will make you sleepy.  It is usually given in a vein (by IV). A shot may also be used to numb the area. If you had local anesthesia, you may feel some pain and discomfort as it wears off. If you have pain, don't be afraid to say so. Pain medicine works better if you take it before the pain gets bad. Common side effects from sedation include:  · Feeling sleepy. (Your doctors and nurses will make sure you are not too sleepy to go home.)  · Nausea and vomiting. This usually does not last long. · Feeling tired. Follow-up care is a key part of your treatment and safety. Be sure to make and go to all appointments, and call your doctor if you are having problems. It's also a good idea to know your test results and keep a list of the medicines you take. How can you care for yourself at home? Activity  · Don't do anything for 24 hours that requires attention to detail. It takes time for the medicine effects to completely wear off. · For your safety, you should not drive or operate any machinery that could be dangerous until the medicine wears off and you can think clearly and react easily. · Rest when you feel tired. Getting enough sleep will help you recover. Diet  · You can eat your normal diet, unless your doctor gives you other instructions. If your stomach is upset, try clear liquids and bland, low-fat foods like plain toast or rice. · Drink plenty of fluids (unless your doctor tells you not to). · Don't drink alcohol for 24 hours. Medicines  · Be safe with medicines. Read and follow all instructions on the label. ¨ If the doctor gave you a prescription medicine for pain, take it as prescribed. ¨ If you are not taking a prescription pain medicine, ask your doctor if you can take an over-the-counter medicine. · If you think your pain medicine is making you sick to your stomach:  ¨ Take your medicine after meals (unless your doctor has told you not to). ¨ Ask your doctor for a different pain medicine.     Follow-up care is a key part of your treatment and safety. Be sure to make and go to all appointments, and call your doctor if you are having problems. It's also a good idea to know your test results and keep a list of the medicines you take. When should you call for help? Call 911 anytime you think you may need emergency care. For example, call if:  · You passed out (lost consciousness). · You have severe trouble breathing. · You have sudden chest pain and shortness of breath, or you cough up blood. · You have symptoms of a heart attack. These may include:  ¨ Chest pain or pressure, or a strange feeling in the chest.  ¨ Sweating. ¨ Shortness of breath. ¨ Nausea or vomiting. ¨ Pain, pressure, or a strange feeling in the back, neck, jaw, or upper belly, or in one or both shoulders or arms. ¨ Lightheadedness or sudden weakness. ¨ A fast or irregular heartbeat. After you call 911, the  may tel you to chew 1 adult-strength or 2 to 4 low-dose aspirin. Wait for an ambulance. Do not try to drive yourself. · You have been diagnosed with angina, and you have symptoms that do not go away with rest or are not getting better within 5 minutes after you take a dose of nitroglycerin. Call your doctor now or seek immediate medical care if:  · You are bleeding from the area where the catheter was put in your artery. · You have a fast-growing, painful lump at the catheter site. · You have signs of infection, such as:  ¨ Increased pain, swelling, warmth, or redness. ¨ Red streaks leading from the catheter site. ¨ Pus draining from the catheter site. ¨ A fever. · Your leg or arm looks blue or feels cold, numb, or tingly. These are general instructions for a healthy lifestyle:    No smoking/ No tobacco products/ Avoid exposure to second hand smoke    Surgeon General's Warning:  Quitting smoking now greatly reduces serious risk to your health.     Obesity, smoking, and sedentary lifestyle greatly increases your risk for illness    A healthy diet, regular physical exercise & weight monitoring are important for maintaining a healthy lifestyle    You may be retaining fluid if you have a history of heart failure or if you experience any of the following symptoms:  Weight gain of 3 pounds or more overnight or 5 pounds in a week, increased swelling in our hands or feet or shortness of breath while lying flat in bed. Please call your doctor as soon as you notice any of these symptoms; do not wait until your next office visit. Recognize signs and symptoms of STROKE:    F-face looks uneven    A-arms unable to move or move unevenly    S-speech slurred or non-existent    T-time-call 911 as soon as signs and symptoms begin-DO NOT go       Back to bed or wait to see if you get better-TIME IS BRAIN. Warning Signs of HEART ATTACK     Call 911 if you have these symptoms:   Chest discomfort. Most heart attacks involve discomfort in the center of the chest that lasts more than a few minutes, or that goes away and comes back. It can feel like uncomfortable pressure, squeezing, fullness, or pain.  Discomfort in other areas of the upper body. Symptoms can include pain or discomfort in one or both arms, the back, neck, jaw, or stomach.  Shortness of breath with or without chest discomfort.  Other signs may include breaking out in a cold sweat, nausea, or lightheadedness. Don't wait more than five minutes to call 911 - MINUTES MATTER! Fast action can save your life. Calling 911 is almost always the fastest way to get lifesaving treatment. Emergency Medical Services staff can begin treatment when they arrive -- up to an hour sooner than if someone gets to the hospital by car.            DISCHARGE SUMMARY from Nurse    PATIENT INSTRUCTIONS:    After general anesthesia or intravenous sedation, for 24 hours or while taking prescription Narcotics:  · Limit your activities  · Do not drive and operate hazardous machinery  · Do not make important personal or business decisions  · Do  not drink alcoholic beverages  · If you have not urinated within 8 hours after discharge, please contact your surgeon on call. Report the following to your surgeon:  · Excessive pain, swelling, redness or odor of or around the surgical area  · Temperature over 100.5  · Nausea and vomiting lasting longer than 4 hours or if unable to take medications  · Any signs of decreased circulation or nerve impairment to extremity: change in color, persistent  numbness, tingling, coldness or increase pain  · Any questions    What to do at Home:  Recommended activity: Activity as tolerated and no driving for today. If you experience any of the following symptoms pain not relieved by over the counter medications, please follow up with Dr. Jose Kessler. *  Please give a list of your current medications to your Primary Care Provider. *  Please update this list whenever your medications are discontinued, doses are      changed, or new medications (including over-the-counter products) are added. *  Please carry medication information at all times in case of emergency situations. These are general instructions for a healthy lifestyle:    No smoking/ No tobacco products/ Avoid exposure to second hand smoke  Surgeon General's Warning:  Quitting smoking now greatly reduces serious risk to your health. Obesity, smoking, and sedentary lifestyle greatly increases your risk for illness    A healthy diet, regular physical exercise & weight monitoring are important for maintaining a healthy lifestyle    You may be retaining fluid if you have a history of heart failure or if you experience any of the following symptoms:  Weight gain of 3 pounds or more overnight or 5 pounds in a week, increased swelling in our hands or feet or shortness of breath while lying flat in bed. Please call your doctor as soon as you notice any of these symptoms; do not wait until your next office visit.         The discharge information has been reviewed with the patient. The patient verbalized understanding. Discharge medications reviewed with the patient and appropriate educational materials and side effects teaching were provided.   ___________________________________________________________________________________________________________________________________

## 2020-02-05 NOTE — Clinical Note
Peripheral Lesion 2, atherectomy performed: rotational. Pass RPM = 90. Duration = 13 sec. Pass # = 2.  SFA POP

## 2020-02-05 NOTE — Clinical Note
Peripheral Lesion 1, atherectomy performed: rotational. Pass RPM = 90. Duration = 12 sec. Pass # = 2.  SFA

## 2020-02-05 NOTE — Clinical Note
Peripheral Lesion 2, atherectomy performed: rotational. Pass RPM = 60. Duration = 15 sec. Pass # = 1.  SFA/ POP

## 2020-02-05 NOTE — Clinical Note
Vessel: right CFA, PFA, SFA, popliteal, PTA, peroneal, EVETTE and dorsalis pedis, Power injection to the artery. Single view taken. PSI = 600. Rate of rise = 0.5 sec. Injection rate = 4 mL/sec. Total injected volume = 40 mL.

## 2020-02-06 NOTE — ANESTHESIA POSTPROCEDURE EVALUATION
Procedure(s): 
ANGIOGRAPHY LOWER EXT RIGHT Atherectomy Peripheral Artery Pta Femoral Popliteal Artery Angioplasty Peripheral Artery. MAC Anesthesia Post Evaluation Multimodal analgesia: multimodal analgesia used between 6 hours prior to anesthesia start to PACU discharge Patient location during evaluation: bedside Patient participation: complete - patient participated Level of consciousness: awake Pain score: 0 Pain management: adequate Airway patency: patent Anesthetic complications: no 
Cardiovascular status: stable Respiratory status: acceptable Hydration status: acceptable Post anesthesia nausea and vomiting:  controlled No vitals data found for the desired time range.

## 2020-02-20 NOTE — PROGRESS NOTES
Subjective:      Refugio Dennis is a 80 y.o. female who presents today for post op visit, status post angiogram. She has knownperipheral artery disease and right leg pain.  Her pain became escalated and she also developed some ulcers on the right foot.  She was sent back by her foot doctor for possibility of angiography    Findings/intervention:  Aortogram reveals patent aorta with atherosclerosis but patent, renal arteries patent. Iliac arteries reduced diffusely atherosclerotic tortuous but patent.     Right leg: Common femoral is patent profundus patent. The SFA at the midportion and through the popliteal has multiple tandem critical stenosis. The tibial vessels have tried vessel occlusion at the proximal location with some reconstitution distally but extensive collateralization has formed.     Successful atherectomy and angioplasty of right SFA and popliteal    Patient has dementia but caretaker says she still complains of pain but its hard to discern if in fact better    Objective:     Visit Vitals  /60 (BP 1 Location: Left arm, BP Patient Position: Sitting)   Pulse (!) 53   Resp 17   Ht 5' 3\" (1.6 m)   Wt 109 lb (49.4 kg)   SpO2 99%   BMI 19.31 kg/m²     I am able to auscultate distal doppler signals, though remain monophasic but I showed images she had improved collateralization and flow into the foot based on opening the proximal inflow. I can tell where she perhaps had a wound of the right first toe but she has no eschar or open wound at this time    Assessment:     Wound check/discharge visit. Plan:       ICD-10-CM ICD-9-CM    1. PAD (peripheral artery disease) ([facility]) I73.9 443.9    2. Right leg pain M79.604 729.5    3. [facility] ulcer of right foot[facility] L97.511 707.15      Orders Placed This Encounter    duloxetine HCl (CYMBALTA PO)     She is to follow up with podiatry.  They should be able to appreciate further changes for foot s/p intervention  Continue good foot care. Call us if any worsening symptoms    Follow-up and Dispositions    · Return if symptoms worsen or fail to improve. KALI Lizarraga    Portions of this note have been entered using voice recognition software.

## 2020-03-03 NOTE — TELEPHONE ENCOUNTER
----- Message from [address] [place] [address] sent at 3/3/2020 10:01 AM EST -----  Patient's nurse, Ms. Louis Walsh called and stated the patient has right leg and ankle swelling with redness, the top of the foot is also swollen, this has been going on for 1 week, the patient can barely walk. Ms. Louis Walsh wants to know if she should be taken to the ED or to come in and see Mechelle Dotson.

## 2020-03-03 NOTE — TELEPHONE ENCOUNTER
Per Niece, patient has been having pain and swelling over the last week and have gotten worse. Advised I had discussed with provider and she would like patient to have US to see if she has occluded her blood flow, but if she feels patient can not wait until tomorrow then patient should go to the ED for evaluation. Niece states that patient is ok to come tomorrow.    Order for duplex of the right leg with no juana placed per verbal order from Arsen Sanchez.

## 2020-03-10 NOTE — H&P
History and [de-identified] 
80year-old female here in follow-up today regarding her peripheral artery disease and right leg pain.  Her pain is becoming escalated she is also developed some ulcers on the right foot.  She is sent back by her foot doctor for possibility of angiography. 
  
       
Past Medical History:  
Diagnosis Date  Glaucoma    
 Heart abnormality    
  arrythmia  Hypertension    
 Reflux    
 Short leg syndrome, right, acquired    
  
       
Patient Active Problem List  
Diagnosis Code  
 HTN (hypertension) I10  
 Lipid disorder E78.9  Glaucoma H40.9  GERD (gastroesophageal reflux disease) K21.9  
 PAD (peripheral artery disease) ([facility]) I73.9  Toe deformity M20.60  
  
         
Past Surgical History:  
Procedure Laterality Date  HX HEENT      
 VASCULAR SURGERY PROCEDURE UNLIST      
  
           
Current Outpatient Medications Medication Sig Dispense Refill  lidocaine-prilocaine (EMLA) topical cream Apply  to affected area as needed for Pain. 30 g 3  
 cilostazol (PLETAL) 100 mg tablet take 1 tablet by mouth BEFORE BREAKFAST AND DINNER 60 Tab 3  
 omeprazole (PRILOSEC) 20 mg capsule Take 20 mg by mouth daily.      
 hydroCHLOROthiazide (HYDRODIURIL) 25 mg tablet Take 25 mg by mouth daily.      
 metoprolol tartrate (LOPRESSOR) 50 mg tablet Take  by mouth two (2) times a day.      
 memantine (NAMENDA) 10 mg tablet Take  by mouth daily.      
 QUEtiapine (SEROQUEL) 50 mg tablet Take 50 mg by mouth two (2) times a day.      
 cilostazol (PLETAL) 100 mg tablet Take 1 Tab by mouth Before breakfast and dinner.  60 Tab 3  
 aspirin delayed-release 81 mg tablet Take  by mouth daily.      
 magnesium citrate solution Take 296 mL by mouth now.      
 metoclopramide HCl (REGLAN) 10 mg tablet Take 10 mg by mouth Before breakfast, lunch, dinner and at bedtime.      
 senna (SENOKOT) 8.6 mg tablet Take 1 Tab by mouth daily.      
  traMADol (ULTRAM) 50 mg tablet Take 50 mg by mouth every six (6) hours as needed for Pain.      
 atorvastatin (LIPITOR) 10 mg tablet Take  by mouth daily.      
 meloxicam (MOBIC) 7.5 mg tablet Take  by mouth daily.      
 polyethylene glycol (MIRALAX) 17 gram packet Take 17 g by mouth daily.      
 meclizine (ANTIVERT) 25 mg tablet Take  by mouth three (3) times daily as needed.      
 amLODIPine (NORVASC) 10 mg tablet Take  by mouth daily.      
 RABEprazole (ACIPHEX) 20 mg tablet Take 20 mg by mouth daily.      
 therapeutic multivitamin (THERAGRAN) tablet Take 1 Tab by mouth daily.      
 Calcium Gluconate 60 mg (648 mg) tab Take  by mouth.      
 levobunolol (BETAGAN) 0.5 % ophthalmic solution Administer 1 Drop to both eyes nightly.      
 brinzolamide (AZOPT) 1 % ophthalmic suspension Administer 1 Drop to both eyes three (3) times daily.      
  
         
Allergies Allergen Reactions  Tylenol [Acetaminophen] Other (comments)  
    GI distress  
  
  
Review of Systems   
A full review of systems was completed times ten organ systems and was deemed negative unless otherwise mentioned in the HPI. 
  
Physical  
Visit Vitals /70 (BP 1 Location: Left arm, BP Patient Position: Sitting) Resp 17 Ht 5' 5\" (1.651 m) Wt 98 lb (44.5 kg) BMI 16.31 kg/m²  
  
  
She is alert and in no distress Head is normocephalic Neck no JVD Chest clear Cardiac regular Abdomen soft flat nontender Nonpalpable pedal pulses she does have foot deformity and there is a ulcer on the big toe on the right and her right foot is painful.   
Impression/Plan:  
    ICD-10-CM ICD-9-CM    
1. PAD (peripheral artery disease) ([facility]) I73.9 443. 9    
2. Right leg pain M79.604 729. 5    
3. Skin ulcer of right foot, limited to breakdown of skin [facility]) L97.511 707.15    
She has had known PAD but now has escalating symptoms despite best medical therapy We talked about risks and benefits of angiography but they are agreeable to proceed with any diagnostic angiogram.  Help with is a opportunity to treat with a minimally invasive technique.

## 2020-03-11 NOTE — ANESTHESIA POSTPROCEDURE EVALUATION
Procedure(s): 
ANGIOGRAPHY LOWER EXT RIGHT Aortagram Abdominal 
Atherectomy Popliteal Artery Angioplasty Peripheral Artery. MAC Anesthesia Post Evaluation Multimodal analgesia: multimodal analgesia used between 6 hours prior to anesthesia start to PACU discharge Patient location during evaluation: PACU Patient participation: complete - patient cannot participate Level of consciousness: agitated Pain score: 0 Airway patency: patent Anesthetic complications: no 
Cardiovascular status: acceptable Respiratory status: acceptable Hydration status: acceptable Post anesthesia nausea and vomiting:  none Vitals Value Taken Time /68 3/11/2020 11:23 AM  
Temp 36.4 °C (97.5 °F) 3/11/2020 11:23 AM  
Pulse 63 3/11/2020 11:23 AM  
Resp 18 3/11/2020 11:23 AM  
SpO2 100 % 3/11/2020 11:23 AM

## 2020-03-11 NOTE — Clinical Note
Peripheral Lesion 1, atherectomy performed: rotational.   Pass RPM = 120. Duration = 12 sec. Pass #: 3.

## 2020-03-11 NOTE — DISCHARGE INSTRUCTIONS
ANGIOGRAPHY DISCHARGE INSTRUCTIONS    1. Check puncture site frequently for swelling or bleeding. If there is any bleeding, lie down and apply pressure over the area with a clean towel or washcloth. Notify your doctor for any redness, swelling, drainage, or oozing from the puncture site. Notify your doctor for any fever or chills. 2. If the extremity becomes cold, numb, or painful go to the Emergency Room. 3. Activity should be limited for the next 48 hours. Climb stairs as little as possible and avoid any stooping, bending, or strenuous activity for 48 hours. No heavy lifting (anything over 8 pounds) for 5 days. 4. You may resume your usual diet. Drink more fluids than usual.  5. Have a responsible person drive you home and stay with you for at least 24 hours after your heart catheterization/angiography. 6. You may remove bandage from your Left Groin in 24 hours. You may shower in 24 hours. No tub baths, hot tubs, or swimming for 1 week. Do not place any lotions, creams, powders, or ointments over puncture site for 1 week. You may place a clean band-aid over the puncture site each day for 5 days. Change daily. 7. If you take Metformin, do not take it for 48 hours. 8. Ask your nurse when to restart any blood thinners. How can you care for yourself at home? Activity  · Do not do strenuous exercise and do not lift, pull, or push anything heavy until your doctor says it is okay. This may be for a day or two. You can walk around the house and do light activity, such as cooking. · You may shower 24 to 48 hours after the procedure, if your doctor okays it. Pat the incision dry. Do not take a bath for 1 week, or until your doctor tells you it is okay. · If the catheter was placed in your groin, try not to walk up stairs for the first couple of days. · If the catheter was placed in your arm near your wrist, do not bend your wrist deeply for the first couple of days.  Be careful using your hand to get into and out of a chair or bed. · If your doctor recommends it, get more exercise. Walking is a good choice. Bit by bit, increase the amount you walk every day. Try for at least 30 minutes on most days of the week. Diet  · Drink plenty of fluids to help your body flush out the dye. If you have kidney, heart, or liver disease and have to limit fluids, talk with your doctor before you increase the amount of fluids you drink. · Keep eating a heart-healthy diet that has lots of fruits, vegetables, and whole grains. If you have not been eating this way, talk to your doctor. You also may want to talk to a dietitian. This expert can help you to learn about healthy foods and plan meals. Medicines  · Your doctor will tell you if and when you can restart your medicines. He or she will also give you instructions about taking any new medicines. · If you take blood thinners, such as warfarin (Coumadin), clopidogrel (Plavix), or aspirin, be sure to talk to your doctor. He or she will tell you if and when to start taking those medicines again. Make sure that you understand exactly what your doctor wants you to do. · Your doctor may prescribe a blood-thinning medicine like aspirin or clopidogrel (Plavix). It is very important that you take these medicines exactly as directed in order to keep the coronary artery open and reduce your risk of a heart attack. Be safe with medicines. Call your doctor if you think you are having a problem with your medicine. Care of the catheter site  · For the first 3 days, keep a bandage over the spot where the catheter was inserted. · Put ice or a cold pack on the area for 10 to 20 minutes at a time to help with soreness or swelling. Put a thin cloth between the ice and your skin. Sedation for a Medical Procedure: Care Instructions  Your Care Instructions  For a minor procedure or surgery, you will get a sedative to help you relax. This drug will make you sleepy.  It is usually given in a vein (by IV). A shot may also be used to numb the area. If you had local anesthesia, you may feel some pain and discomfort as it wears off. If you have pain, don't be afraid to say so. Pain medicine works better if you take it before the pain gets bad. Common side effects from sedation include:  · Feeling sleepy. (Your doctors and nurses will make sure you are not too sleepy to go home.)  · Nausea and vomiting. This usually does not last long. · Feeling tired. Follow-up care is a key part of your treatment and safety. Be sure to make and go to all appointments, and call your doctor if you are having problems. It's also a good idea to know your test results and keep a list of the medicines you take. How can you care for yourself at home? Activity  · Don't do anything for 24 hours that requires attention to detail. It takes time for the medicine effects to completely wear off. · For your safety, you should not drive or operate any machinery that could be dangerous until the medicine wears off and you can think clearly and react easily. · Rest when you feel tired. Getting enough sleep will help you recover. Diet  · You can eat your normal diet, unless your doctor gives you other instructions. If your stomach is upset, try clear liquids and bland, low-fat foods like plain toast or rice. · Drink plenty of fluids (unless your doctor tells you not to). · Don't drink alcohol for 24 hours. Medicines  · Be safe with medicines. Read and follow all instructions on the label. ¨ If the doctor gave you a prescription medicine for pain, take it as prescribed. ¨ If you are not taking a prescription pain medicine, ask your doctor if you can take an over-the-counter medicine. · If you think your pain medicine is making you sick to your stomach:  ¨ Take your medicine after meals (unless your doctor has told you not to). ¨ Ask your doctor for a different pain medicine. Follow-up care is a key part of your treatment and safety. Be sure to make and go to all appointments, and call your doctor if you are having problems. It's also a good idea to know your test results and keep a list of the medicines you take. When should you call for help? Call 911 anytime you think you may need emergency care. For example, call if:  · You passed out (lost consciousness). · You have severe trouble breathing. · You have sudden chest pain and shortness of breath, or you cough up blood. · You have symptoms of a heart attack. These may include:  ¨ Chest pain or pressure, or a strange feeling in the chest.  ¨ Sweating. ¨ Shortness of breath. ¨ Nausea or vomiting. ¨ Pain, pressure, or a strange feeling in the back, neck, jaw, or upper belly, or in one or both shoulders or arms. ¨ Lightheadedness or sudden weakness. ¨ A fast or irregular heartbeat. After you call 911, the  may tel you to chew 1 adult-strength or 2 to 4 low-dose aspirin. Wait for an ambulance. Do not try to drive yourself. · You have been diagnosed with angina, and you have symptoms that do not go away with rest or are not getting better within 5 minutes after you take a dose of nitroglycerin. Call your doctor now or seek immediate medical care if:  · You are bleeding from the area where the catheter was put in your artery. · You have a fast-growing, painful lump at the catheter site. · You have signs of infection, such as:  ¨ Increased pain, swelling, warmth, or redness. ¨ Red streaks leading from the catheter site. ¨ Pus draining from the catheter site. ¨ A fever. · Your leg or arm looks blue or feels cold, numb, or tingly. These are general instructions for a healthy lifestyle:    No smoking/ No tobacco products/ Avoid exposure to second hand smoke    Surgeon General's Warning:  Quitting smoking now greatly reduces serious risk to your health.     Obesity, smoking, and sedentary lifestyle greatly increases your risk for illness    A healthy diet, regular physical exercise & weight monitoring are important for maintaining a healthy lifestyle    You may be retaining fluid if you have a history of heart failure or if you experience any of the following symptoms:  Weight gain of 3 pounds or more overnight or 5 pounds in a week, increased swelling in our hands or feet or shortness of breath while lying flat in bed. Please call your doctor as soon as you notice any of these symptoms; do not wait until your next office visit. Recognize signs and symptoms of STROKE:    F-face looks uneven    A-arms unable to move or move unevenly    S-speech slurred or non-existent    T-time-call 911 as soon as signs and symptoms begin-DO NOT go       Back to bed or wait to see if you get better-TIME IS BRAIN. Warning Signs of HEART ATTACK     Call 911 if you have these symptoms:   Chest discomfort. Most heart attacks involve discomfort in the center of the chest that lasts more than a few minutes, or that goes away and comes back. It can feel like uncomfortable pressure, squeezing, fullness, or pain.  Discomfort in other areas of the upper body. Symptoms can include pain or discomfort in one or both arms, the back, neck, jaw, or stomach.  Shortness of breath with or without chest discomfort.  Other signs may include breaking out in a cold sweat, nausea, or lightheadedness. Don't wait more than five minutes to call 911 - MINUTES MATTER! Fast action can save your life. Calling 911 is almost always the fastest way to get lifesaving treatment. Emergency Medical Services staff can begin treatment when they arrive -- up to an hour sooner than if someone gets to the hospital by car.        DISCHARGE SUMMARY from Nurse    PATIENT INSTRUCTIONS:    After general anesthesia or intravenous sedation, for 24 hours or while taking prescription Narcotics:  · Limit your activities  · Do not drive and operate hazardous machinery  · Do not make important personal or business decisions  · Do  not drink alcoholic beverages  · If you have not urinated within 8 hours after discharge, please contact your surgeon on call. Report the following to your surgeon:  · Excessive pain, swelling, redness or odor of or around the surgical area  · Temperature over 100.5  · Nausea and vomiting lasting longer than 4 hours or if unable to take medications  · Any signs of decreased circulation or nerve impairment to extremity: change in color, persistent  numbness, tingling, coldness or increase pain  · Any questions    What to do at Home:  Recommended activity: Activity as tolerated and no driving for today. If you experience any of the following symptoms pain that is not relieved with over the counter medications, please follow up with Dr. Mary Hoang. *  Please give a list of your current medications to your Primary Care Provider. *  Please update this list whenever your medications are discontinued, doses are      changed, or new medications (including over-the-counter products) are added. *  Please carry medication information at all times in case of emergency situations. These are general instructions for a healthy lifestyle:    No smoking/ No tobacco products/ Avoid exposure to second hand smoke  Surgeon General's Warning:  Quitting smoking now greatly reduces serious risk to your health. Obesity, smoking, and sedentary lifestyle greatly increases your risk for illness    A healthy diet, regular physical exercise & weight monitoring are important for maintaining a healthy lifestyle    You may be retaining fluid if you have a history of heart failure or if you experience any of the following symptoms:  Weight gain of 3 pounds or more overnight or 5 pounds in a week, increased swelling in our hands or feet or shortness of breath while lying flat in bed. Please call your doctor as soon as you notice any of these symptoms; do not wait until your next office visit.         The discharge information has been reviewed with the patient. The patient verbalized understanding. Discharge medications reviewed with the patient and appropriate educational materials and side effects teaching were provided.   ___________________________________________________________________________________________________________________________________

## 2020-03-11 NOTE — PROGRESS NOTES
TRANSFER - OUT REPORT: 
 
Verbal report given to [facility], RN (name) on [facility]  being transferred to Vascular Lab (unit) for ordered procedure Report consisted of patients Situation, Background, Assessment and  
Recommendations(SBAR). Information from the following report(s) SBAR, Intake/Output, MAR and Pre Procedure Checklist was reviewed with the receiving nurse. Lines:    
 
Opportunity for questions and clarification was provided. Patient transported with: 
 Registered Nurse

## 2020-03-11 NOTE — ANESTHESIA PREPROCEDURE EVALUATION
Relevant Problems No relevant active problems Anesthetic History No history of anesthetic complications Review of Systems / Medical History Patient summary reviewed, nursing notes reviewed and pertinent labs reviewed Pulmonary Within defined limits Neuro/Psych Within defined limits Cardiovascular Hypertension: well controlled Dysrhythmias : PVC 
PAD and hyperlipidemia Exercise tolerance: <4 METS: Unable to ambulate well. GI/Hepatic/Renal 
  
GERD: well controlled Endo/Other Within defined limits Other Findings Physical Exam 
 
Airway Mallampati: II 
TM Distance: 4 - 6 cm Neck ROM: normal range of motion Mouth opening: Normal 
 
 Cardiovascular Regular rate and rhythm,  S1 and S2 normal,  no murmur, click, rub, or gallop Rhythm: regular Rate: normal 
 
 
 
 Dental 
 
Dentition: Full upper dentures Pulmonary Breath sounds clear to auscultation Abdominal 
Abdominal exam normal 
 
 
 Other Findings Anesthetic Plan ASA: 3 Anesthesia type: MAC Induction: Intravenous Anesthetic plan and risks discussed with: Healthcare power of

## 2020-03-11 NOTE — Clinical Note
Peripheral Lesion 1, atherectomy performed: rotational.   Pass RPM = 90. Duration = 14 sec. Pass #: 2.

## 2020-03-11 NOTE — Clinical Note
Contrast Dose Calculator:   Patient's age: 80.   Patient's sex: Female. Patient weight (kg) = 44.5. Creatinine level (mg/dL) = 1.06.   Creatinine clearance (mL/min): 22.   Contrast concentration (mg/mL) = 320. MACD = 196.79 mL. Max Contrast dose per Creatinine Cl calculator = 49.5 mL.

## 2020-03-11 NOTE — Clinical Note
TRANSFER - OUT REPORT:     Verbal report given to: Ruben Min RN. Report consisted of patient's Situation, Background, Assessment and   Recommendations(SBAR). Opportunity for questions and clarification was provided. Patient transported with a Cardiac Cath Tech / Patient Care Tech. Patient transported to: [address].

## 2020-03-11 NOTE — PROGRESS NOTES
TRANSFER - IN REPORT: 
 
Verbal report received from Ana(name) on Ace Barraza  being received from cath lab(unit) for routine post - op Report consisted of patients Situation, Background, Assessment and  
Recommendations(SBAR). Information from the following report(s) SBAR, Procedure Summary and Recent Results was reviewed with the receiving nurse. Opportunity for questions and clarification was provided. Assessment completed upon patients arrival to unit and care assumed. Angioseal to LFA, dressing in place. /67 HR 60  
100% o2 on NC

## 2020-03-11 NOTE — Clinical Note
Vessel: right SFA, popliteal, PTA, peroneal and EVETTE. Hand injection to the artery. Multiple views taken.

## 2020-03-11 NOTE — Clinical Note
Power injection to the aorta, artery. Single view taken. PSI = 1000. Rate of rise = 0.5 sec. Injection rate = 10 mL/sec. Total injected volume = 20 mL.

## 2020-03-11 NOTE — PROGRESS NOTES
Cath holding summary Patient escorted to cath holding from waiting area ambulatory, alert and oriented x 1, voicing complaints of right leg pain. Changed into gown and placed on monitor. NPO since MN. Lab results, med rec and H&P reviewed on chart. PIV x 1 inserted without difficulty. Family/POA to bedside.

## 2020-03-11 NOTE — OP NOTES
Preoperative diagnosis: Peripheral artery disease popliteal artery occlusion Postoperative diagnosis: Same Procedures performed: #1  Ultrasound of left femoral artery with interpretation #2  Placement of catheter from left femoral to aorta, aortogram with interpretation, placement of catheter to right common femoral right leg angiogram with interpretation, placement of catheter to right popliteal right popliteal angiogram with interpretation, left femoral left leg angiogram with interpretation #3  Right popliteal atherectomy with angiogram and interpretation #4  Balloon angioplasty of right popliteal and distal superficial femoral artery with interpretation Cultures: None Specimens: None Drains: None Estimated blood loss: Less than 50 mL Assistants: None Implants: Please see above Complications: None Anesthesia: Moderate conscious sedation Indications for the procedure: 
Ace Barraza is a 80 y.o. recurrent right foot pain. Patient was given the appropriate risk and benefits of the procedure including but not limited to bleeding, infection, damage to adjacent structures, MI, stroke, death, loss of lower extremity, need for further surgery. Patient was understanding of all the risks and underwent a procedure. Operative findings:  
#1  Densely atherosclerotic aorta with left renal artery patent apparent calcification and likely critical stenosis of the right renal artery. Flow is very slow for the aorta. No aortic stenosis identified the bifurcation is patent. Bilateral common iliac and external iliac arteries are patent without stenosis extensive tortuosity is noted. Internals are patent as well. Right leg: Common femoral is patent, profundus patent, superficial femoral artery is densely atherosclerotic diffusely but no focal critical stenosis except for distal portion. Small vessel in size.   Right popliteal is a short section total occlusion. Diffuse tibial disease with reconstitution of the posterior tibial further down the leg. Procedure: 
Patient was correctly identified in the precath area and taken to the Cath Lab in stable condition. Patient had pre-incision timeout prior to any incision. Patient was prepped and draped in the normal sterile fashion according to [facility] guidelines aseptic technique. Ultrasound was done of the left femoral artery picture was stored for PACS had posterior calcifications anteriorly was okay to stick at the mid common femoral with no difficulty placed a wire into the aorta. Placed a 4 [place] sheath and replaced the wire with a flush catheter. An aortogram pulled down and placed a wire up and over to the right femoral there was so much tortuosity is somewhat difficult but I was able to get the catheter over in size of the wire to a stiff wire and placed a 6 [place] sheath to the right common femoral to right leg angiogram.  Advanced a Glidewire and quick cross catheter across the occluded popliteal.  There was a blind occlusion beyond this with later reconstitution of this posterior tibial.  I did an atherectomy of the popliteal and ballooned it 3 times the 460 balloon. The best outcome we could achieve here today was this opening of the popliteal down to the collaterals. It is improved from before. Pulled back the sheath had done a left leg angiogram showing a common femoral SFA and profunda are all patent. I deployed a Angio-Seal here she been heparinized with 5000 of heparin for the procedure without complication. Her options are best medical therapy, consideration for bypass by think this is probably a poor choice for her. Or if her symptoms progress she may to consider an amputation.

## 2020-03-11 NOTE — Clinical Note
Peripheral Lesion 1, atherectomy performed: rotational.   Pass RPM = 60. Duration = 13 sec. Pass #: 1.

## 2020-03-11 NOTE — Clinical Note
Peripheral Lesion 1. Lesion #1: Pressure = 10 karen; Duration = 180 sec. Lesion #2: Pressure = 10 karen; Duration = 120 sec. Lesion #3: Pressure = 10 karen; Duration = 120 sec.

## 2020-03-12 NOTE — PROGRESS NOTES
Patient's care giver brought patient to the office per Dr. Nathaly Carranza to remove saline lock left in at time of surgery yesterday at [facility]. Patient presented with right anti cubital saline lock intact , removed tega derm and IV lock, held pressure for 5 min, no bleeding noted , applied band aid and advised the care giver to remove tomorrow. She verbalized understanding.

## 2020-03-19 NOTE — TELEPHONE ENCOUNTER
Called the patient and per patient's daughter she is not having any symptoms,but would prefer to have a call to go over results. advised if things change during that time then to call the office and we can move forward with studies due to patient is now symptomatic. Pt verbalized understanding.

## 2020-03-24 NOTE — PROGRESS NOTES
Refugio Dennis is a 80 y.o. female evaluated via telephone on 3/24/2020. Consent: 
She and/or health care decision maker is aware that that she may receive a bill for this telephone service, depending on her insurance coverage, and has provided verbal consent to proceed: No - Not billable Documentation: I communicated with the patient and/or health care decision maker about her angiogram follow up. Details of this discussion including any medical advice provided: she had successful atherectomy/angioplasty of the popliteal artery and her daughter, who is her caretaker, is able to acknowledge her mother is experiencing significantly less pain and is relieved by the outcome so far. At this time will not plan an additional follow up. The daughter recognizes any point at which to call. Her mom is not a bypass candidate at her age. Will have to triage her presenting symptoms if they do request return follow up to determine course of action KALI Phan

## 2020-07-16 NOTE — PROGRESS NOTES
Wilber Irineogirish    Chief Complaint   Patient presents with    Wound Check       History and Physical    59-year-old female here in follow-up today regarding her peripheral artery disease and right leg pain.  she had angiography in March due to rest pain symptoms and did have Right popliteal atherectomy with Balloon angioplasty of right popliteal and distal superficial femoral artery. Due to the virus we had ended up doing a follow-up phone call to avoid bringing the patient in for her follow-up in March. But at that time it was reported by the caretaker that the patient was feeling and doing much better. Today's appointment request is due to the concern that she now has report of ulcers that will not heal  These are on her right great toe  She is here with her caretaker, and then I later called her daughter on the phone    They had recently been in with podiatry and the question had come up that because she does have what appears to be a good amount of discomfort with this first toe is to whether or not to amputate. They said podiatry did not really support this but said to get the opinion of vascular    She does have some general deformity to this foot. She does have appropriate diabetic shoes but the caretaker says she will take those off at times they do have different topical creams and they will try to use for pain relief and sometimes it is actually hard to figure out how much discomfort she is actually in.   She does still get up and ambulate and walk and seems to do okay with that    Past Medical History:   Diagnosis Date    [facility]    Glaucoma     Heart abnormality     arrythmia    Hypertension     Reflux     Short leg syndrome, right, acquired      Patient Active Problem List   Diagnosis Code    HTN (hypertension) I10    Lipid disorder E78.9    Glaucoma H40.9    GERD (gastroesophageal reflux disease) K21.9    PAD [facility]peripheral artery disease) ([facility]) I73.9    Toe deformity M20.60     Past Surgical History:   Procedure Laterality Date    HX HEENT      VASCULAR SURGERY PROCEDURE UNLIST       Current Outpatient Medications   Medication Sig Dispense Refill    clopidogreL (PLAVIX) 75 mg tab take 1 tablet by mouth once daily 120 Tab 0    duloxetine HCl (CYMBALTA PO) Take  by mouth.  lidocaine-prilocaine (EMLA) topical cream Apply  to affected area as needed for Pain. 30 g 3    cilostazol (PLETAL) 100 mg tablet take 1 tablet by mouth BEFORE BREAKFAST AND DINNER 60 Tab 3    omeprazole (PRILOSEC) 20 mg capsule Take 20 mg by mouth daily.  hydroCHLOROthiazide (HYDRODIURIL) 25 mg tablet Take 25 mg by mouth daily.  metoprolol tartrate (LOPRESSOR) 50 mg tablet Take  by mouth two (2) times a day.  memantine (NAMENDA) 10 mg tablet Take  by mouth daily.  QUEtiapine (SEROQUEL) 50 mg tablet Take 50 mg by mouth two (2) times a day.  cilostazol (PLETAL) 100 mg tablet Take 1 Tab by mouth Before breakfast and dinner. 60 Tab 3    aspirin delayed-release 81 mg tablet Take  by mouth daily.  magnesium citrate solution Take 296 mL by mouth now.  metoclopramide HCl (REGLAN) 10 mg tablet Take 10 mg by mouth Before breakfast, lunch, dinner and at bedtime.  senna (SENOKOT) 8.6 mg tablet Take 1 Tab by mouth daily.  traMADol (ULTRAM) 50 mg tablet Take 50 mg by mouth every six (6) hours as needed for Pain.  atorvastatin (LIPITOR) 10 mg tablet Take  by mouth daily.  polyethylene glycol (MIRALAX) 17 gram packet Take 17 g by mouth daily.  meclizine (ANTIVERT) 25 mg tablet Take  by mouth three (3) times daily as needed.  amLODIPine (NORVASC) 10 mg tablet Take  by mouth daily.  RABEprazole (ACIPHEX) 20 mg tablet Take 20 mg by mouth daily.  therapeutic multivitamin (THERAGRAN) tablet Take 1 Tab by mouth daily.  Calcium Gluconate 60 mg (648 mg) tab Take  by mouth.       levobunolol (BETAGAN) 0.5 % ophthalmic solution Administer 1 Drop to both eyes nightly.  brinzolamide (AZOPT) 1 % ophthalmic suspension Administer 1 Drop to both eyes three (3) times daily.  meloxicam (MOBIC) 7.5 mg tablet Take  by mouth daily. Allergies   Allergen Reactions    Tylenol [Acetaminophen] Other (comments)     GI distress       Physical   Visit Vitals  /70 (BP 1 Location: Left arm, BP Patient Position: Sitting)   Pulse 68   Resp 18   Ht 5' 3\" (1.6 m)   Wt 98 lb (44.5 kg)   SpO2 98%   BMI 17.36 kg/m²     General:  Alert, cooperative, no distress. Head:  Normocephalic, without obvious abnormality, atraumatic. Eyes:    Conjunctivae/corneas clear. Pupils equal, round, reactive to light. Extraocular movements intact. Extremities:  She has some generalized pedal edema of the right foot and some general deformity of the foot   Pulses:  Distal pulses are nonpalpable and distally are monophasic in the right foot. I do hear good biphasic pulse at the popliteal which would be accurate from the last intervention that she had done which was more in the proximal leg   Skin:  She has some general darkening of the right foot compared to the left but no gangrenous changes or necrotic areas. She has 2 pinpoint superficial ulcerations of the medial right first toe probably pressure related. But no drainage no signs of cellulitis. She is tender when you do try to examine this area though             Impression/Plan:     ICD-10-CM ICD-9-CM    1. PAD (peripheral artery disease) ([facility])  I73.9 443.9    2. Right leg pain  M79.604 729.5    3. Skin ulcer of right foot, limited to breakdown of skin ([facility]  L97.511 707.15    4. 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 Support with a caretaker here today and then on the phone with the daughter, we do know she has tibial and small vessel disease.   Trying to do a toe amputation for relief of pain I think would be a poor choice because she would likely not have enough perfusion for healing the toe, and could then compromise that healing to progress to leg amputation. So I think as long as her foot is stable and they can try to keep her comfortable with the measures that are already in place, that there would be no consideration of further intervention for her unless she developed gangrene which I explained. So they seemed appreciable and understanding of these recommendations. I did state and recognize her foot is generally darker pigmentation of the right foot compared to the left which is still probably from her underlying disease on the side, but it is not gangrenous. So if it does change or progress to that then obviously this is a different discussion for all. Follow-up and Dispositions    · Return if symptoms worsen or fail to improve. KALI Khan    Portions of this note have been entered using voice recognition software.

## 2020-09-04 NOTE — TELEPHONE ENCOUNTER
Patient needs a 3 month supply refill on plavix called to [facility] on [address] in [place] [address]

## 2020-09-04 NOTE — TELEPHONE ENCOUNTER
Refill for Plavix 75 mg tablet, take one tablet by mouth daily, quantity 90 with 3 refills sent to [facility] per verbal order Dr. Ciera Jones.

## 2020-11-05 PROBLEM — N39.0 UTI (URINARY TRACT INFECTION): Status: ACTIVE | Noted: 2020-01-01

## 2020-11-05 PROBLEM — R77.8 ELEVATED TROPONIN: Status: ACTIVE | Noted: 2020-01-01

## 2020-11-05 PROBLEM — N17.9 AKI (ACUTE KIDNEY INJURY) (HCC): Status: ACTIVE | Noted: 2020-01-01

## 2020-11-05 PROBLEM — I48.91 ATRIAL FIBRILLATION (HCC): Status: ACTIVE | Noted: 2020-01-01

## 2020-11-05 NOTE — ED PROVIDER NOTES
EMERGENCY DEPARTMENT HISTORY AND PHYSICAL EXAM 
 
4:51 PM 
Date: 11/5/2020 Patient Name: Falguni Leal History of Presenting Illness Chief Complaint Patient presents with  Abnormal Lab Results History Provided By: patient HPI: Falguni Leal is a 80 y.o. female with past medical history of dementia,CVA, hypertension presents from [place] [address], patient has been refusing to eat or drink for the past week. She had lab testing urine analysis today and diagnosed with a UTI and hypernatremia. She is AO*1 at baseline. PCP: Angel Moya MD 
 
Past History Past Medical History: 
Past Medical History:  
Diagnosis Date  Alzheimer disease ([facility] 03/11/2020  Glaucoma  Heart abnormality   
 arrythmia  Hypertension  Reflux  Short leg syndrome, right, acquired Past Surgical History: 
Past Surgical History:  
Procedure Laterality Date  HX HEENT  VASCULAR SURGERY PROCEDURE UNLIST Family History: 
Family History Problem Relation Age of Onset  Hypertension Mother  Diabetes Mother  Heart Disease Mother  Diabetes Father  Cancer Sister  Cancer Brother Social History: 
Social History Tobacco Use  Smoking status: Never Smoker  Smokeless tobacco: Never Used Substance Use Topics  Alcohol use: No  
 Drug use: No  
 
 
Allergies: Allergies Allergen Reactions  Tylenol [Acetaminophen] Other (comments) GI distress Review of Systems AMS Physical Exam  
 
No data found. Physical Exam 
Vitals signs and nursing note reviewed. Constitutional:   
   Appearance: She is well-developed. She is ill-appearing. Comments: cachectic HENT:  
   Head: Normocephalic and atraumatic. Eyes:  
   General:     
   Right eye: No discharge. Left eye: No discharge. Neck: Musculoskeletal: Normal range of motion and neck supple. Cardiovascular: Rate and Rhythm: Normal rate and regular rhythm. Heart sounds: Normal heart sounds. No murmur. Pulmonary:  
   Effort: Pulmonary effort is normal. No respiratory distress. Breath sounds: Normal breath sounds. No stridor. No wheezing or rales. Chest:  
   Chest wall: No tenderness. Abdominal:  
   General: Bowel sounds are normal. There is no distension. Palpations: Abdomen is soft. Tenderness: There is no abdominal tenderness. There is no guarding or rebound. Musculoskeletal: Normal range of motion. Skin: 
   General: Skin is warm and dry. Comments: Very dry membranes Neurological:  
   Mental Status: She is disoriented. Diagnostic Study Results Labs - Recent Results (from the past 12 hour(s)) CBC WITH AUTOMATED DIFF Collection Time: 11/05/20  1:33 PM  
Result Value Ref Range WBC 6.2 4.6 - 13.2 K/uL  
 RBC 4.39 4.20 - 5.30 M/uL  
 HGB 13.9 12.0 - 16.0 g/dL HCT 43.6 35.0 - 45.0 % MCV 99.3 (H) 74.0 - 97.0 FL  
 MCH 31.7 24.0 - 34.0 PG  
 MCHC 31.9 31.0 - 37.0 g/dL  
 RDW 14.6 (H) 11.6 - 14.5 % PLATELET 246 661 - 500 K/uL MPV 12.8 (H) 9.2 - 11.8 FL  
 NEUTROPHILS 60 40 - 73 % LYMPHOCYTES 29 21 - 52 % MONOCYTES 9 3 - 10 % EOSINOPHILS 1 0 - 5 % BASOPHILS 1 0 - 2 %  
 ABS. NEUTROPHILS 3.9 1.8 - 8.0 K/UL  
 ABS. LYMPHOCYTES 1.8 0.9 - 3.6 K/UL  
 ABS. MONOCYTES 0.5 0.05 - 1.2 K/UL  
 ABS. EOSINOPHILS 0.0 0.0 - 0.4 K/UL  
 ABS. BASOPHILS 0.0 0.0 - 0.1 K/UL  
 DF AUTOMATED METABOLIC PANEL, BASIC Collection Time: 11/05/20  1:33 PM  
Result Value Ref Range Sodium 164 (HH) 136 - 145 mmol/L Potassium 3.3 (L) 3.5 - 5.5 mmol/L Chloride 123 (H) 100 - 111 mmol/L  
 CO2 30 21 - 32 mmol/L Anion gap 11 3.0 - 18 mmol/L Glucose 162 (H) 74 - 99 mg/dL BUN 89 (H) 7.0 - 18 MG/DL Creatinine 2.81 (H) 0.6 - 1.3 MG/DL  
 BUN/Creatinine ratio 32 (H) 12 - 20 GFR est AA 19 (L) >60 ml/min/1.73m2 GFR est non-AA 16 (L) >60 ml/min/1.73m2 Calcium 9.8 8.5 - 10.1 MG/DL URINALYSIS W/ RFLX MICROSCOPIC Collection Time: 11/05/20  1:45 PM  
Result Value Ref Range Color DARK YELLOW Appearance TURBID Specific gravity 1.020 1.005 - 1.030    
 pH (UA) 5.0 5.0 - 8.0 Protein TRACE (A) NEG mg/dL Glucose Negative NEG mg/dL Ketone TRACE (A) NEG mg/dL Bilirubin Negative NEG Blood TRACE (A) NEG Urobilinogen 1.0 0.2 - 1.0 EU/dL Nitrites Negative NEG Leukocyte Esterase MODERATE (A) NEG URINE MICROSCOPIC ONLY Collection Time: 11/05/20  1:45 PM  
Result Value Ref Range WBC 5 to 10 0 - 4 /hpf  
 RBC 2 to 3 0 - 5 /hpf Epithelial cells 4+ 0 - 5 /lpf Bacteria 3+ (A) NEG /hpf Radiologic Studies - No results found. Medical Decision Making ED Course: Progress Notes, Reevaluation, and Consults: 
 
4:51 PM Initial assessment performed. The patients presenting problems have been discussed, and they/their family are in agreement with the care plan formulated and outlined with them. I have encouraged them to ask questions as they arise throughout their visit. Provider Notes (Medical Decision Making): 
Patient presents from nursing home with UTI, hypernatremia Labs as interpreted by me: Hypernatremia Sodium 160 BRETT: 2.99 Very dry membranes-dehydration UA positive for UTI Will be treated with IV antibiotics and IV fluids X-ray chest as interpreted by me no pneumonia Will be tested for Covid Patient on atrial fibrillation Troponin:0.3 could be related to demand ischemia. Spoke to Bart, no plan for intervention at the moment. Troponin to be trended Spoke to Dr. Abelino Hubbard patient will be admitted telemtry 
 
 
-Mrs Frazier Course niece [phone] power of  Vital Signs-Reviewed the patient's vital signs. Reviewed pt's pulse ox reading. EKG: Interpreted by me. Time Interpreted: 17:51  Rate: 118 
 Rhythm: atrial fibrillation Interpretation:ST depression Records Reviewed:  old medical records (Time of Review: 4:51 PM) 
-I am the first provider for this patient. 
-I reviewed the vital signs, available nursing notes, past medical history, past surgical history, family history and social history. Current Outpatient Medications Medication Sig Dispense Refill  clopidogreL (PLAVIX) 75 mg tab Take 1 Tab by mouth daily. 90 Tab 3  
 duloxetine HCl (CYMBALTA PO) Take  by mouth.  lidocaine-prilocaine (EMLA) topical cream Apply  to affected area as needed for Pain. 30 g 3  
 cilostazol (PLETAL) 100 mg tablet take 1 tablet by mouth BEFORE BREAKFAST AND DINNER 60 Tab 3  
 omeprazole (PRILOSEC) 20 mg capsule Take 20 mg by mouth daily.  hydroCHLOROthiazide (HYDRODIURIL) 25 mg tablet Take 25 mg by mouth daily.  metoprolol tartrate (LOPRESSOR) 50 mg tablet Take  by mouth two (2) times a day.  memantine (NAMENDA) 10 mg tablet Take  by mouth daily.  QUEtiapine (SEROQUEL) 50 mg tablet Take 50 mg by mouth two (2) times a day.  cilostazol (PLETAL) 100 mg tablet Take 1 Tab by mouth Before breakfast and dinner. 60 Tab 3  
 aspirin delayed-release 81 mg tablet Take  by mouth daily.  magnesium citrate solution Take 296 mL by mouth now.  metoclopramide HCl (REGLAN) 10 mg tablet Take 10 mg by mouth Before breakfast, lunch, dinner and at bedtime.  senna (SENOKOT) 8.6 mg tablet Take 1 Tab by mouth daily.  traMADol (ULTRAM) 50 mg tablet Take 50 mg by mouth every six (6) hours as needed for Pain.  atorvastatin (LIPITOR) 10 mg tablet Take  by mouth daily.  meloxicam (MOBIC) 7.5 mg tablet Take  by mouth daily.  polyethylene glycol (MIRALAX) 17 gram packet Take 17 g by mouth daily.  meclizine (ANTIVERT) 25 mg tablet Take  by mouth three (3) times daily as needed.  amLODIPine (NORVASC) 10 mg tablet Take  by mouth daily.  RABEprazole (ACIPHEX) 20 mg tablet Take 20 mg by mouth daily.  therapeutic multivitamin (THERAGRAN) tablet Take 1 Tab by mouth daily.  Calcium Gluconate 60 mg (648 mg) tab Take  by mouth.  levobunolol (BETAGAN) 0.5 % ophthalmic solution Administer 1 Drop to both eyes nightly.  brinzolamide (AZOPT) 1 % ophthalmic suspension Administer 1 Drop to both eyes three (3) times daily. Clinical Impression Clinical Impression: No diagnosis found. Disposition: admit This note was dictated utilizing voice recognition software which may lead to typographical errors. I apologize in advance if the situation occurs. If questions arise please do not hesitate to contact me or call our department.  
 
Yesi Morfin MD 
4:51 PM

## 2020-11-05 NOTE — ED TRIAGE NOTES
Pt presents from [facility] for not eating, drinking or taking meds x 1 week. labwork done today showed Na level of 164. States physician wants pt evaluated for dehydration and BRETT. Pt tested weekly for CoVid, last test was last Thursday and was negative.

## 2020-11-06 NOTE — CONSULTS
[facility] - Consult Note Consultation request by Jean Valencia MD for advice/opinion related to evaluating UTI (urinary tract infection) [N39.0] BRETT (acute kidney injury) ([facility] [N17.9] [facility] ([facility] [I48.91] Elevated troponin [R77.8] UTI (urinary tract infection) [N39.0] Date of  Admission: 11/5/2020  4:47 PM  
Primary Care Physician:  Rossana Elam MD 
 
 Assessment:  
 
-Presented from NH with decreased oral intake over the previous week. -Atrial fibrillation with RVR, new diagnosis in setting of below. Poor candidate for aggressive NOAC, plan ASA for now. 
-Indeterminate troponin, follow up pending, suspect secondary demand ischemia in setting of below. 
-Abnormal ECG with ST depressions in setting of tachycardia and hypotension, follow up pending. 
-Urinary tract infection. 
-Acute kidney injury. 
-Hypernatremia. 
-Hypokalemia. -Hypertension. -H/o PAD. 
-H/o CVA. -Dementia. 
-DNR. No primary cardiologist. 
 
 Plan: Will review follow up ECG and troponin. Will review echocardiogram. 
Will stop norvasc and use BB for HR control. Continue electrolyte replacement as needed. Patient is on plavix, pletal for her PAD. Patient does not appear to be appropriate candidate for [address] [place] [address] given advanced age, frail state and dementia. Would recommend conservative cardiac management in setting of advanced age and comorbidities. Would not recommend ischemic evaluation with lack of unstable cardiac symptoms. Will appreciate palliative care to discuss goals of care. Would continue hydration and treatment of underlying UTI. I saw, examined, and evaluated the patient. I personally reviewed the patient's labs, tests, vitals, orders, medications, updated history, and other providers assessments. I personally agree with the findings as stated and the plan as documented. History of Present Illness: This is a 80 y.o. female admitted for UTI (urinary tract infection) [N39.0] BRETT (acute kidney injury) ([facility] [N17.9] Atrial fibrillation ([facility] [I48.91] Elevated troponin [R77.8] UTI (urinary tract infection) [N39.0]. Patient complains of:  Decreased oral intake. Patient is a 80year old NH patient who presented to ER from nursing home after refusing to eat or drinkl for the past week. Patient is found ot have afib with RVR. Patient is found to have UTI and dehydration. Patient is being hydrated. BP remains low normal. HR mildly elevated. Patient unable to provide any history. Cardiac risk factors: hypertension Review of Symptoms:  Unable to provide reliable history. Past Medical History:  
 
Past Medical History:  
Diagnosis Date  Alzheimer disease ([facility] 03/11/2020  Glaucoma  Heart abnormality   
 arrythmia  Hypertension  Reflux  Short leg syndrome, right, acquired Social History:  
 
Social History Socioeconomic History  Marital status: SINGLE Spouse name: Not on file  Number of children: Not on file  Years of education: Not on file  Highest education level: Not on file Tobacco Use  Smoking status: Never Smoker  Smokeless tobacco: Never Used Substance and Sexual Activity  Alcohol use: No  
 Drug use: No  
 
 
 Family History:  
 
Family History Problem Relation Age of Onset  Hypertension Mother  Diabetes Mother  Heart Disease Mother  Diabetes Father  Cancer Sister  Cancer Brother Medications: Allergies Allergen Reactions  Tylenol [Acetaminophen] Other (comments) GI distress Current Facility-Administered Medications Medication Dose Route Frequency  amLODIPine (NORVASC) tablet 10 mg  10 mg Oral DAILY  aspirin delayed-release tablet 81 mg  81 mg Oral DAILY  atorvastatin (LIPITOR) tablet 10 mg  10 mg Oral DAILY  dorzolamide (TRUSOPT) 2 % ophthalmic solution 1 Drop  1 Drop Both Eyes TID  cilostazoL (PLETAL) tablet 100 mg  100 mg Oral ACB&D  clopidogreL (PLAVIX) tablet 75 mg  75 mg Oral DAILY  DULoxetine (CYMBALTA) capsule 20 mg  20 mg Oral DAILY  timolol (TIMOPTIC) 0.5 % ophthalmic solution 1 Drop  1 Drop Both Eyes BID  memantine (NAMENDA) tablet 10 mg  10 mg Oral DAILY  pantoprazole (PROTONIX) tablet 20 mg  20 mg Oral ACB  polyethylene glycol (MIRALAX) packet 17 g  17 g Oral DAILY PRN  
 ondansetron (ZOFRAN ODT) tablet 4 mg  4 mg Oral Q8H PRN Or  
 ondansetron (ZOFRAN) injection 4 mg  4 mg IntraVENous Q6H PRN  
 dextrose 5 % - 0.45% NaCl infusion  75 mL/hr IntraVENous CONTINUOUS  
 heparin (porcine) injection 5,000 Units  5,000 Units SubCUTAneous Q12H  cefTRIAXone (ROCEPHIN) 1 g in sterile water (preservative free) 10 mL IV syringe  1 g IntraVENous Q24H Physical Exam:  
 
Visit Vitals /68 (BP 1 Location: Right arm, BP Patient Position: At rest) Pulse (!) 110 Temp 97.5 °F (36.4 °C) Resp 16 Wt 96 lb (43.5 kg) SpO2 98% BMI 17.01 kg/m² BP Readings from Last 3 Encounters:  
11/06/20 139/68  
07/16/20 102/70  
03/11/20 161/75 Pulse Readings from Last 3 Encounters:  
11/06/20 (!) 110  
07/16/20 68  
03/11/20 72 Wt Readings from Last 3 Encounters:  
11/06/20 96 lb (43.5 kg) 07/16/20 98 lb (44.5 kg) 03/11/20 98 lb (44.5 kg) General:  fatigued, no distress Neck:  no JVD Lungs:  clear to auscultation bilaterally Heart:  irregularly irregular rhythm Abdomen:  abdomen is soft without significant tenderness, masses, organomegaly or guarding Extremities:  extremities normal, atraumatic, no cyanosis or edema Skin: Warm and dry. no hyperpigmentation, vitiligo, or suspicious lesions Neuro: alert, not oriented Psych: non focal 
 
 Data Review:  
 
Recent Labs 11/05/20 
1705 11/05/20 
1333 WBC 6.7 6.2 HGB 14.2 13.9 HCT 43.4 43.6  265 Recent Labs 11/05/20 
1705 11/05/20 
1333 * 164* K 3.5 3.3*  
* 123* CO2 28 30 * 162* BUN 90* 89* CREA 2.99* 2.81* CA 9.4 9.8 ALB 2.6*  --   
ALT 14  -- Results for orders placed or performed during the hospital encounter of 11/05/20 EKG, 12 LEAD, INITIAL Result Value Ref Range Ventricular Rate 118 BPM  
 Atrial Rate 118 BPM  
 QRS Duration 110 ms  
 Q-T Interval 364 ms QTC Calculation (Bezet) 510 ms Calculated R Axis 92 degrees Calculated T Axis -90 degrees Diagnosis Atrial fibrillation with rapid ventricular response Anterolateral infarct , age undetermined Marked ST abnormality, possible inferior subendocardial injury Abnormal ECG No previous ECGs available All Cardiac Markers in the last 24 hours:   
Lab Results Component Value Date/Time  (H) 11/05/2020 06:00 PM  
 CKMB 4.2 (H) 11/05/2020 06:00 PM  
 CKND1 1.5 11/05/2020 06:00 PM  
 TROIQ 0.32 (H) 11/05/2020 06:00 PM  
 
 
Last Lipid:  No results found for: CHOL, CHOLX, CHLST, CHOLV, HDL, HDLP, LDL, LDLC, DLDLP, TGLX, TRIGL, TRIGP, CHHD, CHHDX Signed By: KALI Valle November 6, 2020

## 2020-11-06 NOTE — ED NOTES
TRANSFER - OUT REPORT: 
 
Verbal report given to Aime Cruz RN(name) on Karen Rasmussen  being transferred to [address] [place] [address](unit) for routine progression of care Report consisted of patients Situation, Background, Assessment and  
Recommendations(SBAR). Information from the following report(s) SBAR was reviewed with the receiving nurse. Lines:  
Peripheral IV 11/05/20 Left Antecubital (Active) Site Assessment Clean, dry, & intact 11/05/20 1709 Phlebitis Assessment 0 11/05/20 1709 Infiltration Assessment 0 11/05/20 1709 Dressing Status Clean, dry, & intact 11/05/20 1709 Dressing Type Transparent 11/05/20 1709 Hub Color/Line Status Flushed;Patent 11/05/20 1709 Peripheral IV 11/05/20 Right Forearm (Active) Site Assessment Clean, dry, & intact 11/05/20 1804 Phlebitis Assessment 0 11/05/20 1804 Infiltration Assessment 0 11/05/20 1804 Dressing Type Transparent 11/05/20 1804 Hub Color/Line Status Flushed;Patent 11/05/20 1804 Opportunity for questions and clarification was provided. Patient transported with: 
 Monitor

## 2020-11-06 NOTE — PROGRESS NOTES
Problem: Falls - Risk of 
Goal: *Absence of Falls Description: Document Luis Carlos Cifuentes Fall Risk and appropriate interventions in the flowsheet. Outcome: Progressing Towards Goal 
Note: Fall Risk Interventions: 
  
 
Mentation Interventions: Bed/chair exit alarm, Door open when patient unattended Elimination Interventions: Bed/chair exit alarm, Toilet paper/wipes in reach, Toileting schedule/hourly rounds Problem: Patient Education: Go to Patient Education Activity Goal: Patient/Family Education Outcome: Progressing Towards Goal 
  
Problem: Pressure Injury - Risk of 
Goal: *Prevention of pressure injury Description: Document Gualberto Scale and appropriate interventions in the flowsheet. Outcome: Progressing Towards Goal 
Note: Pressure Injury Interventions: 
Sensory Interventions: Assess changes in LOC, Assess need for specialty bed, Check visual cues for pain, Keep linens dry and wrinkle-free, Maintain/enhance activity level, Minimize linen layers Moisture Interventions: Absorbent underpads, Check for incontinence Q2 hours and as needed Activity Interventions: Assess need for specialty bed Mobility Interventions: Assess need for specialty bed Nutrition Interventions: Document food/fluid/supplement intake, Offer support with meals,snacks and hydration Friction and Shear Interventions: HOB 30 degrees or less, Minimize layers Problem: Pain Goal: *Control of Pain Outcome: Progressing Towards Goal 
Goal: *PALLIATIVE CARE:  Alleviation of Pain Outcome: Progressing Towards Goal 
  
Problem: Injury - Risk of, Adverse Drug Event Goal: *Absence of adverse drug events Outcome: Progressing Towards Goal 
Goal: *Absence of medication errors Outcome: Progressing Towards Goal 
Goal: *Knowledge of prescribed medications Outcome: Progressing Towards Goal

## 2020-11-06 NOTE — PROGRESS NOTES
Contacted Saturnino WAYNE at [facility] and pt's [address] (niece) to complete admission database. Filiberto Clark states pt needs her glasses because pt is mostly blind, she also states that the pt has a clubbed right foot and cannot walk without her specialty shoes. Per [facility], pt has been wheelchair bound since residing with them. Filiberto Clark states the pt only eats soft meats and prefers vegetables, will relay. Room # and pt's code given to Filiberto Clark. Copy of pt's DNR paperwork faxed over from [facility] and placed in pt's chart. Dr Alee Latham notified.

## 2020-11-06 NOTE — ACP (ADVANCE CARE PLANNING)
Palliative Medicine Advanced Steps Advance Care Planning Conversation Eileen (Physician Orders for Scope of Treatment) Date of conversation: 11/6/2020   Location: [facility] Length (minutes): 30 Participants: 
 
[] Other Surrogate Decision Maker / Next of Kin Name: Hernando Ross Relationship to Patient: Samuel Phone Number: [phone] Advanced Steps® ACP Facilitator: Jony Shaikh RN Conversation Topics (If Patient does not have decision making capacity, Agent/Surrogate responds based on understanding of how patient would respond if capable) Understanding of Medical Condition/s AND Potential Complications: 
 
Healthcare Agent/Other Surrogate:  Per pt's niece Hernando Ross, the pt was never  and had no children. She does have 2 sisters (80 and 80) but both are chronically ill and unable to participate in medical decision making for the pt. This would make the pt's niece Hernando Ross as legal NOK. There is a General POA paperwork on file but it does not include a medical clause. Darielvalariejack Zaidi is legal NOK. Esther Zaidi states that the pt has been at [facility] for the past month. Her 80year old sister also lives there. She was living with Esther Zaidi for the past 8 years but about a month ago her dementia became so bad that she had to be placed at [facility]. Per Esther Zaidi, pt was wandering at home, up all hours of the night, always asking to be taken to the hospital and she required assistance with all ADL's. Knows pt was admitted to [facility] with severe dehydration, BRETT and UTI. Pt has had dementia for approx the last 8 years and it has been significantly worsening over the past few months. Pt does have a DDNR that [facility] faxed over to us. Discussed benefits and burdens of intubation and feeding tubes. Elissajack Zaidi is in agreement with making the pt DNR/DNI and no feeding tubes. POST form completed.   Hospice support was also briefly introduced if rehospitalizations become too burdensome for pt or if her quality of life declines. Destinee Naranjo was open to this idea but stated she wanted to give the pt an opportunity to recover from these illnesses before thinking about hospice. Duc Floyd also wants staff to know pt is blind in her right eye and really needs her eyeglasses. She also has a right club foot and has special shoes that she has to wear in order to walk correctly. Needs to discuss with spiritual/Nondenominational advisor: [] Yes  [x] No 
 
Needs more information about illness and complications:  [x] Yes  [] No 
 
 
Cardiopulmonary Resuscitation Order Elected for CPR:  []  Attempt Resuscitation [x]  Do Not Attempt Resuscitation When NOT in Cardiopulmonary Arrest, Order Elected:   
 
[] Comfort Measures 
[x] Limited Additional Interventions [] Full Interventions Artificially Administered Nutrition, Order Elected: 
 
[x] No Feeding Tube  
[] Feeding Tube for a defined trial period 
[] Feeding Tube long-term if indicated The following was provided (check all that apply): Healthcare Decision Information Cards: 
 [] Help with Breathing Facts 
 [] Tube Feeding Facts [] CPR Facts  
  
[] Review of existing Advance Directive 
[] Assistance with Completion of New Advance Directive [x] Review of [place] POST Form Meeting Outcomes: 
 [] ACP discussion completed 
 [x] [place] form completed 
[x] [place] prepared for Provider review and signature 
 [x] Original placed on Chart, if in facility (form to be sent with patient at discharge) [x] Copy given to healthcare agent  
 [] Copy scanned to electronic medical record Follow-up plan:   
 [] Schedule follow-up conversation to continue planning 
 [x] Referred individual to Provider for additional questions/concerns [x] Advised patient/agent/surrogate to review completed POST form and update if needed with changes in condition, patient preferences or care setting [] This note routed to one or more involved healthcare providers Pt is DNR/DNI. NO feeding tubes. BSRN and Attending updated. Potential dispo plan will be back to LTC at [facility] when medically stable. Thank you for the Palliative Medicine consult and allowing us to participate in the care of Ms. Werner Trejo. Will continue to monitor and provide support. MARLEN Abernathy Palliative Medicine Inpatient RN DR. [facility] Palliative [place] Line: [phone] ([phone])

## 2020-11-06 NOTE — CONSULTS
Consult Note Consult requested by: Dr. Leim Bosworth Sharif Cui is a 80 y.o. female [address] who is being seen on consult for BRETT, hypernatremia Chief Complaint Patient presents with  Abnormal Lab Results Admission diagnosis: abnormal lab.  
 
14-year-old female with past medical history of dementia, hypertension from nursing home admitted for UTI, severe dehydration, following for BRETT and hyponatremia Impression & Plan:  
IMPRESSION:  
BRETT, baseline creatinine around 1 mg per DL earlier this year. She has severe dehydration as well as sepsis. Hyponatremia from dehydration Sepsis, UTI Dementia PLAN:  
Her sodium is gradually improving, will switch her IV fluid from D5 half-normal saline to D5W at 75 cc/h. Monitor blood sugar closely. Continue antibiotic per primary team. 
Agree with palliative care team input. Discussed with Dr. Leim Bosworth. She is not able to provide any history, history was obtained with chart review and discussion with the primary team. 
HPI: 14-year-old female from nursing home with a history of dementia, hypertension brought to the emergency room for abnormal lab. In the emergency room her work-up shows UTI severe hyponatremia and BRETT. Nephrology service consulted for further assistant. During my visit she appeared cachectic, sleepy. Past Medical History:  
Diagnosis Date  Alzheimer disease ([facility] 03/11/2020  Glaucoma  Heart abnormality   
 arrythmia  Hypertension  Reflux  Short leg syndrome, right, acquired Past Surgical History:  
Procedure Laterality Date  HX HEENT  VASCULAR SURGERY PROCEDURE UNLIST Social History Socioeconomic History  Marital status: SINGLE Spouse name: Not on file  Number of children: Not on file  Years of education: Not on file  Highest education level: Not on file Occupational History  Not on file Social Needs  Financial resource strain: Not on file  Food insecurity Worry: Not on file Inability: Not on file  Transportation needs Medical: Not on file Non-medical: Not on file Tobacco Use  Smoking status: Never Smoker  Smokeless tobacco: Never Used Substance and Sexual Activity  Alcohol use: No  
 Drug use: No  
 Sexual activity: Not on file Lifestyle  Physical activity Days per week: Not on file Minutes per session: Not on file  Stress: Not on file Relationships  Social connections Talks on phone: Not on file Gets together: Not on file Attends Jainism service: Not on file Active member of club or organization: Not on file Attends meetings of clubs or organizations: Not on file Relationship status: Not on file  Intimate partner violence Fear of current or ex partner: Not on file Emotionally abused: Not on file Physically abused: Not on file Forced sexual activity: Not on file Other Topics Concern  Not on file Social History Narrative  Not on file Family History Problem Relation Age of Onset  Hypertension Mother  Diabetes Mother  Heart Disease Mother  Diabetes Father  Cancer Sister  Cancer Brother Allergies Allergen Reactions  Tylenol [Acetaminophen] Other (comments) GI distress Home Medications:  
 
Prior to Admission Medications Prescriptions Last Dose Informant Patient Reported? Taking? Calcium Gluconate 60 mg (648 mg) tab   Yes No  
Sig: Take  by mouth. QUEtiapine (SEROQUEL) 50 mg tablet   Yes No  
Sig: Take 50 mg by mouth two (2) times a day. RABEprazole (ACIPHEX) 20 mg tablet   Yes No  
Sig: Take 20 mg by mouth daily. amLODIPine (NORVASC) 10 mg tablet   Yes No  
Sig: Take  by mouth daily. aspirin delayed-release 81 mg tablet   Yes No  
Sig: Take  by mouth daily. atorvastatin (LIPITOR) 10 mg tablet   Yes No  
Sig: Take  by mouth daily. brinzolamide (AZOPT) 1 % ophthalmic suspension   Yes No  
Sig: Administer 1 Drop to both eyes three (3) times daily. cilostazol (PLETAL) 100 mg tablet   No No  
Sig: Take 1 Tab by mouth Before breakfast and dinner. cilostazol (PLETAL) 100 mg tablet   No No  
Sig: take 1 tablet by mouth BEFORE BREAKFAST AND DINNER  
clopidogreL (PLAVIX) 75 mg tab   No No  
Sig: Take 1 Tab by mouth daily. duloxetine HCl (CYMBALTA PO)   Yes No  
Sig: Take  by mouth. hydroCHLOROthiazide (HYDRODIURIL) 25 mg tablet   Yes No  
Sig: Take 25 mg by mouth daily. levobunolol (BETAGAN) 0.5 % ophthalmic solution   Yes No  
Sig: Administer 1 Drop to both eyes nightly. lidocaine-prilocaine (EMLA) topical cream   No No  
Sig: Apply  to affected area as needed for Pain.  
magnesium citrate solution   Yes No  
Sig: Take 296 mL by mouth now.  
meclizine (ANTIVERT) 25 mg tablet   Yes No  
Sig: Take  by mouth three (3) times daily as needed. meloxicam (MOBIC) 7.5 mg tablet   Yes No  
Sig: Take  by mouth daily. memantine (NAMENDA) 10 mg tablet   Yes No  
Sig: Take  by mouth daily. metoclopramide HCl (REGLAN) 10 mg tablet   Yes No  
Sig: Take 10 mg by mouth Before breakfast, lunch, dinner and at bedtime. metoprolol tartrate (LOPRESSOR) 50 mg tablet   Yes No  
Sig: Take  by mouth two (2) times a day. omeprazole (PRILOSEC) 20 mg capsule   Yes No  
Sig: Take 20 mg by mouth daily. polyethylene glycol (MIRALAX) 17 gram packet   Yes No  
Sig: Take 17 g by mouth daily. senna (SENOKOT) 8.6 mg tablet   Yes No  
Sig: Take 1 Tab by mouth daily. therapeutic multivitamin [facility]) tablet   Yes No  
Sig: Take 1 Tab by mouth daily. traMADol (ULTRAM) 50 mg tablet   Yes No  
Sig: Take 50 mg by mouth every six (6) hours as needed for Pain. Facility-Administered Medications: None Current Facility-Administered Medications Medication Dose Route Frequency  aspirin delayed-release tablet 81 mg  81 mg Oral DAILY  atorvastatin (LIPITOR) tablet 10 mg  10 mg Oral DAILY  dorzolamide (TRUSOPT) 2 % ophthalmic solution 1 Drop  1 Drop Both Eyes TID  cilostazoL (PLETAL) tablet 100 mg  100 mg Oral ACB&D  clopidogreL (PLAVIX) tablet 75 mg  75 mg Oral DAILY  DULoxetine (CYMBALTA) capsule 20 mg  20 mg Oral DAILY  timolol (TIMOPTIC) 0.5 % ophthalmic solution 1 Drop  1 Drop Both Eyes BID  memantine (NAMENDA) tablet 10 mg  10 mg Oral DAILY  pantoprazole (PROTONIX) tablet 20 mg  20 mg Oral ACB  polyethylene glycol (MIRALAX) packet 17 g  17 g Oral DAILY PRN  
 ondansetron (ZOFRAN ODT) tablet 4 mg  4 mg Oral Q8H PRN Or  
 ondansetron (ZOFRAN) injection 4 mg  4 mg IntraVENous Q6H PRN  
 heparin (porcine) injection 5,000 Units  5,000 Units SubCUTAneous Q12H  cefTRIAXone (ROCEPHIN) 1 g in sterile water (preservative free) 10 mL IV syringe  1 g IntraVENous Q24H  
 metoprolol tartrate (LOPRESSOR) tablet 25 mg  25 mg Oral Q12H  
 dextrose 5% infusion  75 mL/hr IntraVENous CONTINUOUS Review of Systems: As above Data Review: 
 
Labs: Results:  
   
Chemistry Recent Labs 11/05/20 
1705 11/05/20 
1333 * 162* * 164* K 3.5 3.3*  
* 123* CO2 28 30 BUN 90* 89* CREA 2.99* 2.81* CA 9.4 9.8 AGAP 11 11 BUCR 30* 32* AP 89  --   
TP 6.7  --   
ALB 2.6*  --   
GLOB 4.1*  --   
AGRAT 0.6*  --   
  
CBC w/Diff Recent Labs 11/05/20 
1705 11/05/20 
1333 WBC 6.7 6.2 RBC 4.40 4.39  
HGB 14.2 13.9 HCT 43.4 43.6  265 GRANS 75* 60  
LYMPH 15* 29 EOS 0 1 Coagulation No results for input(s): PTP, INR, APTT, INREXT in the last 72 hours. Iron/Ferritin No results for input(s): IRON in the last 72 hours. No lab exists for component: TIBCCALC BNP No results for input(s): BNPP in the last 72 hours. Cardiac Enzymes Recent Labs 11/06/20 
0958 11/05/20 
1800 * 273* CKND1 2.3 1.5 Liver Enzymes Recent Labs 11/05/20 
1705 TP 6.7 ALB 2.6* AP 89 Thyroid Studies No results found for: T4, T3U, TSH, TSHEXT    
 EKG: Atrial fibrillation Physical Assessment:  
 
Visit Vitals /64 (BP 1 Location: Left arm, BP Patient Position: At rest) Pulse 90 Temp 98 °F (36.7 °C) Resp 16 Wt 43.5 kg (96 lb) SpO2 99% BMI 17.01 kg/m² Weight change:  
 
Intake/Output Summary (Last 24 hours) at 11/6/2020 1308 Last data filed at 11/6/2020 1258 Gross per 24 hour Intake 1000 ml Output  Net 1000 ml Physical Exam:  
General:cachexia, no respiratory distress HEENT sclera anicteric, CVS: S1S2 heard,  no rub RS: + air entry b/l, Abd: Soft, Non tender, Neuro: dementia Extrm: no  edema, no cyanosis, clubbing Skin: no visible  Rash Musculoskeletal: No gross joints or bone deformities Procedures/imaging: see electronic medical records for all procedures, Xrays and details which were not copied into this note but were reviewed prior to creation of Nicolette Gutierrez MD 
November 6, 2020 [facility] [phone]

## 2020-11-06 NOTE — PROGRESS NOTES
Reason for Admission:  UTI (urinary tract infection) [N39.0] BRETT (acute kidney injury) ([facility] [address] [N17.9] Atrial fibrillation ([facility] [address] [I48.91] Elevated troponin [R77.8] UTI (urinary tract infection) [N39.0] RUR Score:    12 Plan for utilizing home health:    no   
                 
Likelihood of Readmission:   LOW Transition of Care Plan:         
 
 
Initial assessment completed with relative(s). Cognitive status of patient: disoriented. Face sheet information confirmed:  yes. The patient designates Niece Bert Nielsen to participate in her discharge plan and to receive any needed information. This patient lives in a [address] [place] [address] with patient. Patient is not able to navigate steps as needed. Prior to hospitalization, patient was considered to be independent with ADLs/IADLS : no . If not independent,  patient needs assist with : dressing, bathing, food preparation, cooking, toileting, grooming and self feeding Patient has a current ACP document on file: yes The patient and BLS will be available to transport patient home upon discharge. The patient already has unknown, medical equipment available in the home. Patient is not currently active with home health. Patient has stayed in a skilled nursing facility or rehab. Was  stay within last 60 days : yes. Patient is LTC at [address] [place] [address]. Self Pay This patient is on dialysis :no 
 
 Freedom of choice signed: no. Currently, the discharge plan is LTC. The patient states that she can obtain her medications from the pharmacy, and take her medications as directed. Patient's current insurance is [employer] Care Management Interventions PCP Verified by CM: Yes Mode of Transport at Discharge: BLS The Plan for Transition of Care is Related to the Following Treatment Goals : LTC Discharge [facility] Scooby Rose, RN BSN Care Manager [phone]

## 2020-11-06 NOTE — ROUTINE PROCESS
Bedside and Verbal shift change report given to [employer] RN and Freda RN (oncoming nurse) by La Theodore (offgoing nurse). Report included the following information SBAR, Kardex, Intake/Output, MAR and Recent Results.

## 2020-11-06 NOTE — PROGRESS NOTES
Echocardiogram completed. Patient returned to room with armband in place. Report to follow.  
 
[address] [facility]

## 2020-11-06 NOTE — ED NOTES
Rounding completed. Patient resting quietly on stretcher. Vitals stable on monitor. Patient is clean and dry and warm blankets provided. Will continue to monitor.

## 2020-11-06 NOTE — CONSULTS
Palliative Medicine Consult Patient Name: Sharif Cui YOB: 1924 Date of Initial Consult: 11/6/2020 Reason for Consult: Goals of care discussions Requesting Provider: Gloria Robert MD 
Primary Care Physician: Rossana Elam MD 
  
 SUMMARY:  
Sharif Cui is a 80 y.o. with a past history of Alzheimer disease, HTN, PAD, and CVA who was admitted on 11/5/2020 from [facility] with a diagnosis of severe dehydration with BRETT, UTI, and A-fib. Current medical issues leading to Palliative Medicine involvement include: support and care decisions. PALLIATIVE DIAGNOSES:  
1. Goals of care discussions 2. Severe dehydration and malnutrition 3. UTI 4. Dementia 5. Debility PLAN:  
1. Goals of care discussions: Palliative medicine team including MARY Aguilar RN and I met with patient at patient's bedside. Patient is awake, alert, and oriented to self, pulling clothing and blankets off, trying to get out of bed. Attempted to reorient patient but not successful. Nonverbal, no command following. NAD noted. DDNR on file. Called pt's niece Jessica Garcia- confirmed patient has no AMD- the pt was never  and had no children. She does have 2 sisters (80 and 80) but both are chronically ill and unable to participate in medical decision making for the pt per Jessica Garcia. Patient's niece Jessica Garcia is legal NOK. Called niece to provide a medical update and discuss goals of care. Discussed the benefits and burdens of CPR in the event of cardiopulmonary arrest in the setting of advanced age, dementia, and other comorbidities. Discussed the benefits and burdens of intubation in the event of respiratory distress pre-arrest. Discussed the benefits and burdens of feeding tubes in the event of dysphagia or nutritional decline. Discussed the benefits and burdens of continuing current level of care versus initiating comfort measures with the support of hospice at discharge.  Per Tanya Dao, patient would not want intubation, mechanical ventilation, tube feeding, or CPR for any reason. She would like to continue current level of care with DNR/DNI, no feeding tubes. POST form sent to Tanya Dao YANCY JACOB via [employer] for electronic signature to reflect DNR/DNI, limited interventions, NO feeding tubes, awaiting return of signature- unable to come to hospital to complete POST form due to the visitor restrictions set forth by the hospital in response to COVID-19. Will continue to follow with you. 2. Severe dehydration and malnutrition: Poor PO intake for the past week. Body mass index is 17.01 kg/m². Last albumin 2.6 on 11/5/2020.  
3. UTI: POA. On IVAB and IV fluids. 4. Dementia: FAST score 7B. See number 5. 
5. Debility: pt has been at [facility] for the past month. Her 80year old sister also lives there. She was living with Erika Regalado for the past 8 years but about a month ago her dementia became so bad that she had to be placed at [facility]. Per Khadijahdavid Regalado, pt was wandering at home, up all hours of the night, always asking to be taken to the hospital and she required assistance with all ADL's. 
6. Initial consult note routed to primary continuity provider 7. Communicated plan of care with: Palliative IDT, patient's family, RN, MD 
 
 
 GOALS OF CARE / TREATMENT PREFERENCES:  
[====Goals of Care====] GOALS OF CARE:  DNR/DNI, no feeding tubes Patient/Health Care Proxy Stated Goals: Prolong life TREATMENT PREFERENCES:  
Code Status: DNR Advance Care Planning: 
Advance Care Planning 11/6/2020 Patient's Healthcare Decision Maker is: Legal Next of Kin Confirm Advance Directive None Patient Would Like to Complete Advance Directive Unable Does the patient have other document types MOST/MOLST/POST/POLST;Power of [address] [place] [address] Medical Interventions: Limited additional interventions 
continue current level of care with DNR/DNI, no feeding tubes. Artificially Administered Nutrition: No feeding tube The palliative care team has discussed with patient / health care proxy about goals of care / treatment preferences for patient. 
[====Goals of Care====] HISTORY:  
 
History obtained from: patient's family, chart CHIEF COMPLAINT: n/a 
 
HPI/SUBJECTIVE: The patient is:  
[] Verbal and participatory [x] Non-participatory due to:  
Confused, nonverbal, no command following Clinical Pain Assessment (nonverbal scale for severity on nonverbal patients): Adult Nonverbal Pain Scale Face: No particular expression or smile Activity (Movement): Restless, excessive activity and/or withdrawal reflexes Guarding: Lying quietly, no positioning of hands over areas of body Physiology (Vital Signs): Stable vital signs Respiratory: Baseline RR/SpO2 compliant with ventilator Total Score: 2 FUNCTIONAL ASSESSMENT:  
 
Palliative Performance Scale (PPS): PPS: 40 PSYCHOSOCIAL/SPIRITUAL SCREENING:  
 
Advance Care Planning: 
Advance Care Planning 11/6/2020 Patient's Healthcare Decision Maker is: Legal Next of Kin Confirm Advance Directive None Patient Would Like to Complete Advance Directive Unable Does the patient have other document types MOST/MOLST/POST/POLST;Power of [address] [place] [address] Avenue Any spiritual / Taoist concerns: unable to assess 
[] Yes /  [] No 
 
Caregiver Burnout: 
[] Yes /  [] No /  [x] No Caregiver Present Anticipatory grief assessment:  Unable to assess 
[] Normal  / [] Maladaptive REVIEW OF SYSTEMS:  
 
Positive and pertinent negative findings in ROS are noted above in HPI. The following systems were [] reviewed / [x] unable to be reviewed as noted in HPI Other findings are noted below. Systems: constitutional, ears/nose/mouth/throat, respiratory, gastrointestinal, genitourinary, musculoskeletal, integumentary, neurologic, psychiatric, endocrine. Positive findings noted below. Modified ESAS Completed by: provider Stool Occurrence(s): 1 PHYSICAL EXAM:  
 
From RN flowsheet: 
Wt Readings from Last 3 Encounters:  
11/06/20 43.5 kg (96 lb)  
07/16/20 44.5 kg (98 lb)  
03/11/20 44.5 kg (98 lb) Blood pressure 124/71, pulse 72, temperature 97.4 °F (36.3 °C), resp. rate 16, height 5' 3\" (1.6 m), weight 43.5 kg (96 lb), SpO2 98 %. Pain Scale 1: PAINAD (Advanced Dementia) Pain Intensity 1: 0 Constitutional: Awake, alert, NAD, frail appearing ENMT: dry mucous membranes Cardiovascular: distal pulses intact Respiratory: breathing not labored, symmetric Musculoskeletal: no deformity, no tenderness to palpation Skin: warm, dry Neurologic: not following commands, moving all extremities, nonverbal  
 
 
 HISTORY:  
 
Active Problems: 
  Atrial fibrillation ([facility] [address] (11/5/2020) UTI (urinary tract infection) (11/5/2020) Elevated troponin (11/5/2020) BRETT (acute kidney injury) ([facility] [address] (11/5/2020) Past Medical History:  
Diagnosis Date  Alzheimer disease ([facility] [address] 03/11/2020  Glaucoma  Heart abnormality   
 arrythmia  Hypertension  Reflux  Short leg syndrome, right, acquired Past Surgical History:  
Procedure Laterality Date  HX HEENT  VASCULAR SURGERY PROCEDURE UNLIST Family History Problem Relation Age of Onset  Hypertension Mother  Diabetes Mother  Heart Disease Mother  Diabetes Father  Cancer Sister  Cancer Brother History reviewed, no pertinent family history. Social History Tobacco Use  Smoking status: Never Smoker  Smokeless tobacco: Never Used Substance Use Topics  Alcohol use: No  
 
Allergies Allergen Reactions  Tylenol [Acetaminophen] Other (comments) GI distress Current Facility-Administered Medications Medication Dose Route Frequency  aspirin delayed-release tablet 81 mg  81 mg Oral DAILY  atorvastatin (LIPITOR) tablet 10 mg  10 mg Oral DAILY  dorzolamide (TRUSOPT) 2 % ophthalmic solution 1 Drop  1 Drop Both Eyes TID  cilostazoL (PLETAL) tablet 100 mg  100 mg Oral ACB&D  clopidogreL (PLAVIX) tablet 75 mg  75 mg Oral DAILY  DULoxetine (CYMBALTA) capsule 20 mg  20 mg Oral DAILY  timolol (TIMOPTIC) 0.5 % ophthalmic solution 1 Drop  1 Drop Both Eyes BID  memantine (NAMENDA) tablet 10 mg  10 mg Oral DAILY  pantoprazole (PROTONIX) tablet 20 mg  20 mg Oral ACB  polyethylene glycol (MIRALAX) packet 17 g  17 g Oral DAILY PRN  
 ondansetron (ZOFRAN ODT) tablet 4 mg  4 mg Oral Q8H PRN Or  
 ondansetron (ZOFRAN) injection 4 mg  4 mg IntraVENous Q6H PRN  
 heparin (porcine) injection 5,000 Units  5,000 Units SubCUTAneous Q12H  cefTRIAXone (ROCEPHIN) 1 g in sterile water (preservative free) 10 mL IV syringe  1 g IntraVENous Q24H  
 metoprolol tartrate (LOPRESSOR) tablet 25 mg  25 mg Oral Q12H  
 dextrose 5% infusion  75 mL/hr IntraVENous CONTINUOUS  
 LORazepam (ATIVAN) injection 1 mg  1 mg IntraVENous Q6H PRN  
 
 
 
 LAB AND IMAGING FINDINGS:  
 
Lab Results Component Value Date/Time WBC 6.7 11/05/2020 05:05 PM  
 HGB 14.2 11/05/2020 05:05 PM  
 PLATELET 017 74/58/5021 05:05 PM  
 
Lab Results Component Value Date/Time Sodium 160 (H) 11/05/2020 05:05 PM  
 Potassium 3.5 11/05/2020 05:05 PM  
 Chloride 121 (H) 11/05/2020 05:05 PM  
 CO2 28 11/05/2020 05:05 PM  
 BUN 90 (H) 11/05/2020 05:05 PM  
 Creatinine 2.99 (H) 11/05/2020 05:05 PM  
 Calcium 9.4 11/05/2020 05:05 PM  
  
Lab Results Component Value Date/Time Alk. phosphatase 89 11/05/2020 05:05 PM  
 Protein, total 6.7 11/05/2020 05:05 PM  
 Albumin 2.6 (L) 11/05/2020 05:05 PM  
 Globulin 4.1 (H) 11/05/2020 05:05 PM  
 
Lab Results Component Value Date/Time  INR 0.9 03/11/2020 09:16 AM  
 Prothrombin time 12.4 03/11/2020 09:16 AM  
  
No results found for: IRON, FE, TIBC, IBCT, PSAT, FERR  
 No results found for: PH, PCO2, PO2 No components found for: [place] Lab Results Component Value Date/Time  (H) 11/06/2020 09:58 AM  
 CK - MB 4.9 (H) 11/06/2020 09:58 AM  
  
 
 
   
 
Total time: 70 minutes Counseling / coordination time, spent as noted above: 60 minutes 
> 50% counseling / coordination?: yes, patient family, RN, MD 
 
Prolonged service was provided for  []30 min   []75 min in face to face time in the presence of the patient, spent as noted above. Time Start:  
Time End:  
Note: this can only be billed with 44092 (initial) or 28484 (follow up). If multiple start / stop times, list each separately.

## 2020-11-06 NOTE — PROGRESS NOTES
Dorzolamide (TRUSOPT) 2 % ophthalmic solution was therapeutically interchanged for brinzolamide (AZOPT) 1 % ophthalmic solution per the P&T Committee approved Therapeutic Interchanges Policy.  
 
PORTER Ben[facility]tez [facility] Pharmacist 
11/6/2020 3:01 AM

## 2020-11-06 NOTE — H&P
HISTORY & PHYSICAL Patient: Meryle Kenner MRN: [id number]  CSN: [id number] YOB: 1924  Age: 80 y.o. Sex: female DOA: 11/5/2020 LOS:  LOS: 1 day DOA: 11/5/2020 Assessment/Plan Active Problems: 
  Atrial fibrillation ([facility].) (11/5/2020) UTI (urinary tract infection) (11/5/2020) Elevated troponin (11/5/2020) BRETT (acute kidney injury) ([facility].) (11/5/2020) Plan: 1. UTIIV Rocephin, urine culture sent, await report 2. BRETTlikely prerenal, IVF, monitor creatinine 3. Hyper natremialikely secondary to dehydration, IVF, monitor sodium levels, if no improvement nephrology consult. 4. Elevated troponinlikely demand ischemia, serial cardiac enzymes, echocardiogram, cardiology consult. 5. History of hypertension 6. History of dementia 7. History of CVA DVT prophylaxisHeparin DNR Palliative care consult in a.m. HPI:  
 
Meryle Kenner is a 80 y.o. female who is a resident of [facility], at baseline she is AO x1presents to the emergency room secondary to refusing to eat or drink for the past week. Patient is unable to give any significant information. ER evaluationpatient noted to have UTI, hyponatremia, BRETT, elevated troponin. Patient has been started on IV fluids, IV antibiotics, will be admitted to the hospital for further evaluation and treatment. Past Medical History:  
Diagnosis Date  Alzheimer disease ([facility].) 03/11/2020  Glaucoma  Heart abnormality   
 arrythmia  Hypertension  Reflux  Short leg syndrome, right, acquired Past Surgical History:  
Procedure Laterality Date  HX HEENT  VASCULAR SURGERY PROCEDURE UNLIST Family History Problem Relation Age of Onset  Hypertension Mother  Diabetes Mother  Heart Disease Mother  Diabetes Father  Cancer Sister  Cancer Brother Social History Socioeconomic History  Marital status: SINGLE  
 Spouse name: Not on file  Number of children: Not on file  Years of education: Not on file  Highest education level: Not on file Tobacco Use  Smoking status: Never Smoker  Smokeless tobacco: Never Used Substance and Sexual Activity  Alcohol use: No  
 Drug use: No  
 
 
Prior to Admission medications Medication Sig Start Date End Date Taking? Authorizing Provider  
clopidogreL (PLAVIX) 75 mg tab Take 1 Tab by mouth daily. 9/4/20   Amber Cantu MD  
duloxetine HCl (CYMBALTA PO) Take  by mouth. Provider, Historical  
lidocaine-prilocaine (EMLA) topical cream Apply  to affected area as needed for Pain. 3/13/19   KALI Peralta  
cilostazol (PLETAL) 100 mg tablet take 1 tablet by mouth BEFORE BREAKFAST AND DINNER 12/18/18   Amber Cantu MD  
omeprazole (PRILOSEC) 20 mg capsule Take 20 mg by mouth daily. Provider, Historical  
hydroCHLOROthiazide (HYDRODIURIL) 25 mg tablet Take 25 mg by mouth daily. Provider, Historical  
metoprolol tartrate (LOPRESSOR) 50 mg tablet Take  by mouth two (2) times a day. Provider, Historical  
memantine (NAMENDA) 10 mg tablet Take  by mouth daily. Provider, Historical  
QUEtiapine (SEROQUEL) 50 mg tablet Take 50 mg by mouth two (2) times a day. Provider, Historical  
cilostazol (PLETAL) 100 mg tablet Take 1 Tab by mouth Before breakfast and dinner. 5/18/18   Claude Barbara, MD  
aspirin delayed-release 81 mg tablet Take  by mouth daily. Provider, Historical  
magnesium citrate solution Take 296 mL by mouth now. Provider, Historical  
metoclopramide HCl (REGLAN) 10 mg tablet Take 10 mg by mouth Before breakfast, lunch, dinner and at bedtime. Provider, Historical  
senna (SENOKOT) 8.6 mg tablet Take 1 Tab by mouth daily. Provider, Historical  
traMADol (ULTRAM) 50 mg tablet Take 50 mg by mouth every six (6) hours as needed for Pain.     Provider, Historical  
 atorvastatin (LIPITOR) 10 mg tablet Take  by mouth daily. Provider, Historical  
meloxicam (MOBIC) 7.5 mg tablet Take  by mouth daily. Provider, Historical  
polyethylene glycol (MIRALAX) 17 gram packet Take 17 g by mouth daily. Provider, Historical  
meclizine (ANTIVERT) 25 mg tablet Take  by mouth three (3) times daily as needed. Provider, Historical  
amLODIPine (NORVASC) 10 mg tablet Take  by mouth daily. Provider, Historical  
RABEprazole (ACIPHEX) 20 mg tablet Take 20 mg by mouth daily. Provider, Historical  
therapeutic multivitamin (THERAGRAN) tablet Take 1 Tab by mouth daily. Provider, Historical  
Calcium Gluconate 60 mg (648 mg) tab Take  by mouth. Provider, Historical  
levobunolol (BETAGAN) 0.5 % ophthalmic solution Administer 1 Drop to both eyes nightly. Provider, Historical  
brinzolamide (AZOPT) 1 % ophthalmic suspension Administer 1 Drop to both eyes three (3) times daily. Provider, Historical  
 
 
Allergies Allergen Reactions  Tylenol [Acetaminophen] Other (comments) GI distress Review of Systems Review of systems not obtained due to patient factors. Physical Exam:  
  
Visit Vitals /63 (BP 1 Location: Left arm, BP Patient Position: At rest) Pulse 86 Temp 97.5 °F (36.4 °C) Resp 16 SpO2 100% Physical Exam: 
 
Gen: In general, this is a poorly nourished female in no acute distress HEENT: Sclerae nonicteric. Oral mucous membranes dry. Dentition poor Neck: Supple with midline trachea. CV: RRR without murmur or rub appreciated. Resp:Respirations are unlabored without use of accessory muscles. Lung fields B/L without wheezes or rhonchi. Abd: Soft, nontender, nondistended. Extrem: Extremities are warm, without cyanosis or clubbing. No pitting pretibial edema. Skin: Warm, no visible rashes. Neuro: Unable to complete neurological examination Labs Reviewed: 
 
Recent Results (from the past 24 hour(s)) CBC WITH AUTOMATED DIFF Collection Time: 11/05/20  1:33 PM  
Result Value Ref Range WBC 6.2 4.6 - 13.2 K/uL  
 RBC 4.39 4.20 - 5.30 M/uL  
 HGB 13.9 12.0 - 16.0 g/dL HCT 43.6 35.0 - 45.0 % MCV 99.3 (H) 74.0 - 97.0 FL  
 MCH 31.7 24.0 - 34.0 PG  
 MCHC 31.9 31.0 - 37.0 g/dL  
 RDW 14.6 (H) 11.6 - 14.5 % PLATELET 269 014 - 288 K/uL MPV 12.8 (H) 9.2 - 11.8 FL  
 NEUTROPHILS 60 40 - 73 % LYMPHOCYTES 29 21 - 52 % MONOCYTES 9 3 - 10 % EOSINOPHILS 1 0 - 5 % BASOPHILS 1 0 - 2 %  
 ABS. NEUTROPHILS 3.9 1.8 - 8.0 K/UL  
 ABS. LYMPHOCYTES 1.8 0.9 - 3.6 K/UL  
 ABS. MONOCYTES 0.5 0.05 - 1.2 K/UL  
 ABS. EOSINOPHILS 0.0 0.0 - 0.4 K/UL  
 ABS. BASOPHILS 0.0 0.0 - 0.1 K/UL  
 DF AUTOMATED METABOLIC PANEL, BASIC Collection Time: 11/05/20  1:33 PM  
Result Value Ref Range Sodium 164 (HH) 136 - 145 mmol/L Potassium 3.3 (L) 3.5 - 5.5 mmol/L Chloride 123 (H) 100 - 111 mmol/L  
 CO2 30 21 - 32 mmol/L Anion gap 11 3.0 - 18 mmol/L Glucose 162 (H) 74 - 99 mg/dL BUN 89 (H) 7.0 - 18 MG/DL Creatinine 2.81 (H) 0.6 - 1.3 MG/DL  
 BUN/Creatinine ratio 32 (H) 12 - 20 GFR est AA 19 (L) >60 ml/min/1.73m2 GFR est non-AA 16 (L) >60 ml/min/1.73m2 Calcium 9.8 8.5 - 10.1 MG/DL URINALYSIS W/ RFLX MICROSCOPIC Collection Time: 11/05/20  1:45 PM  
Result Value Ref Range Color DARK YELLOW Appearance TURBID Specific gravity 1.020 1.005 - 1.030    
 pH (UA) 5.0 5.0 - 8.0 Protein TRACE (A) NEG mg/dL Glucose Negative NEG mg/dL Ketone TRACE (A) NEG mg/dL Bilirubin Negative NEG Blood TRACE (A) NEG Urobilinogen 1.0 0.2 - 1.0 EU/dL Nitrites Negative NEG Leukocyte Esterase MODERATE (A) NEG URINE MICROSCOPIC ONLY Collection Time: 11/05/20  1:45 PM  
Result Value Ref Range WBC 5 to 10 0 - 4 /hpf  
 RBC 2 to 3 0 - 5 /hpf Epithelial cells 4+ 0 - 5 /lpf  Bacteria 3+ (A) NEG /hpf  
CBC WITH AUTOMATED DIFF  
 Collection Time: 11/05/20  5:05 PM  
Result Value Ref Range WBC 6.7 4.6 - 13.2 K/uL  
 RBC 4.40 4.20 - 5.30 M/uL  
 HGB 14.2 12.0 - 16.0 g/dL HCT 43.4 35.0 - 45.0 % MCV 98.6 (H) 74.0 - 97.0 FL  
 MCH 32.3 24.0 - 34.0 PG  
 MCHC 32.7 31.0 - 37.0 g/dL  
 RDW 14.5 11.6 - 14.5 % PLATELET 988 963 - 446 K/uL MPV 12.6 (H) 9.2 - 11.8 FL  
 NEUTROPHILS 75 (H) 40 - 73 % LYMPHOCYTES 15 (L) 21 - 52 % MONOCYTES 10 3 - 10 % EOSINOPHILS 0 0 - 5 % BASOPHILS 0 0 - 2 %  
 ABS. NEUTROPHILS 5.0 1.8 - 8.0 K/UL  
 ABS. LYMPHOCYTES 1.0 0.9 - 3.6 K/UL  
 ABS. MONOCYTES 0.7 0.05 - 1.2 K/UL  
 ABS. EOSINOPHILS 0.0 0.0 - 0.4 K/UL  
 ABS. BASOPHILS 0.0 0.0 - 0.1 K/UL  
 DF AUTOMATED PLATELET COMMENTS LARGE PLATELETS METABOLIC PANEL, COMPREHENSIVE Collection Time: 11/05/20  5:05 PM  
Result Value Ref Range Sodium 160 (H) 136 - 145 mmol/L Potassium 3.5 3.5 - 5.5 mmol/L Chloride 121 (H) 100 - 111 mmol/L  
 CO2 28 21 - 32 mmol/L Anion gap 11 3.0 - 18 mmol/L Glucose 141 (H) 74 - 99 mg/dL BUN 90 (H) 7.0 - 18 MG/DL Creatinine 2.99 (H) 0.6 - 1.3 MG/DL  
 BUN/Creatinine ratio 30 (H) 12 - 20 GFR est AA 18 (L) >60 ml/min/1.73m2 GFR est non-AA 15 (L) >60 ml/min/1.73m2 Calcium 9.4 8.5 - 10.1 MG/DL Bilirubin, total 0.5 0.2 - 1.0 MG/DL  
 ALT (SGPT) 14 13 - 56 U/L  
 AST (SGOT) 25 10 - 38 U/L Alk. phosphatase 89 45 - 117 U/L Protein, total 6.7 6.4 - 8.2 g/dL Albumin 2.6 (L) 3.4 - 5.0 g/dL Globulin 4.1 (H) 2.0 - 4.0 g/dL A-G Ratio 0.6 (L) 0.8 - 1.7 URINALYSIS W/ RFLX MICROSCOPIC Collection Time: 11/05/20  5:20 PM  
Result Value Ref Range Color DARK YELLOW Appearance TURBID Specific gravity 1.019 1.005 - 1.030    
 pH (UA) 5.0 5.0 - 8.0 Protein 30 (A) NEG mg/dL Glucose Negative NEG mg/dL Ketone TRACE (A) NEG mg/dL Bilirubin MODERATE (A) NEG Blood SMALL (A) NEG Urobilinogen 1.0 0.2 - 1.0 EU/dL  Nitrites Negative NEG    
 Leukocyte Esterase MODERATE (A) NEG URINE MICROSCOPIC ONLY Collection Time: 11/05/20  5:20 PM  
Result Value Ref Range WBC 11 to 20 0 - 4 /hpf  
 RBC 0 to 3 0 - 5 /hpf Epithelial cells 1+ 0 - 5 /lpf Bacteria 4+ (A) NEG /hpf SARS-COV-2 Collection Time: 11/05/20  5:45 PM  
Result Value Ref Range Specimen source Nasopharyngeal    
 COVID-19 rapid test Not detected NOTD Specimen type NP Swab EKG, 12 LEAD, INITIAL Collection Time: 11/05/20  5:51 PM  
Result Value Ref Range Ventricular Rate 118 BPM  
 Atrial Rate 118 BPM  
 QRS Duration 110 ms  
 Q-T Interval 364 ms QTC Calculation (Bezet) 510 ms Calculated R Axis 92 degrees Calculated T Axis -90 degrees Diagnosis Atrial fibrillation with rapid ventricular response Anterolateral infarct , age undetermined Marked ST abnormality, possible inferior subendocardial injury Abnormal ECG No previous ECGs available CARDIAC PANEL,(CK, CKMB & TROPONIN) Collection Time: 11/05/20  6:00 PM  
Result Value Ref Range CK - MB 4.2 (H) <3.6 ng/ml CK-MB Index 1.5 0.0 - 4.0 %  (H) 26 - 192 U/L Troponin-I, QT 0.32 (H) 0.0 - 0.045 NG/ML Imaging Reviewed: XR Results (most recent): 
Results from Hospital Encounter encounter on 11/05/20 XR CHEST PORT Narrative INDICATION: Altered mental status TECHNIQUE: Portable chest radiograph COMPARISON: None FINDINGS:  
 
Multiple medical assist devices overlie the patient, thus limiting 
interpretation. The lungs are adequately inflated. No focal consolidation, effusion or 
pneumothorax. Heart size within normal limits. No acute osseous abnormality. Right distal rotator cuff calcific tendinitis. Impression IMPRESSION:  
 
No acute findings.   
  
 
 
 
 
 
 
 
Robert Jimenez MD 
11/6/2020, 2:41 AM 
 
 
 
Disclaimer: Sections of this note are dictated using utilizing voice recognition software. Minor typographical errors may be present. If questions arise, please do not hesitate to contact me or call our department.
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
## 2020-11-06 NOTE — PROGRESS NOTES
Timolol maleate (TIMOPTIC) 0.5% ophthalmic drops BID was therapeutically interchanged for levobunoloL (BETAGAN) 0.5 % ophthalmic solution DAILY per the P&T Committee approved Therapeutic Interchanges Policy.  
 
Salbador Griffin [facility] Pharmacist 
11/6/2020 3:06 AM

## 2020-11-06 NOTE — PROGRESS NOTES
[facility] Hospitalist Group Progress Note Patient: Cherylene Kitchens Age: 80 y.o. : 1924 MR#: [id number] SSN: [id number] Date/Time: 2020 C/C: AMS Subjective: HPI : Elderly patient from NH / severe dehydration with BRETT - UTI - POA , Afib Review of systems: No CP  
NO NVD No SOB  NO Cough Assessment/Plan: 1. Acute metabolic encephalopathy 2 Hypernatremia  
3 UTI - POA - / acute cystitis 4 BRETT - likley pre renal - nephrology consulted 5 dementia 
10 advanced age 7 mild elevated troponin-secondary to BRETT #high trending flat Plan 
-Supportive care -IV hydration monitoring electrolytes and renal function 
-Renal consultation 
-Continue Rocephin for UTI Time spent on direct patient care >30 mints Complexity : High complex - due to multiple medical issues outlined above. CODE Status : DNR Case discussed with:  []Patient  [] Family  [x]Nursing  [x]Case Management DVT Prophylaxis:  []Lovenox  [x]Hep SQ  []SCDs  []Coumadin   []On Heparin gtt Objective:  
VS:  
Visit Vitals /68 Pulse 90 Temp 98 °F (36.7 °C) Resp 16 Ht 5' 3\" (1.6 m) Wt 43.5 kg (96 lb) SpO2 99% BMI 17.01 kg/m² Tmax/24hrs: Temp (24hrs), Av.6 °F (36.4 °C), Min:97.4 °F (36.3 °C), Max:98 °F (36.7 °C) IOBRIEF Intake/Output Summary (Last 24 hours) at 2020 1519 Last data filed at 2020 1258 Gross per 24 hour Intake 1000 ml Output  Net 1000 ml General: Awake but orientation is questionable noncooperative no behavioral issue HEENT: No facial asymmetry, MARION Dillon, External ears - WNL Cardiovascular: S1S2 - regular , No Murmur Pulmonary: Equal expansion , No Use of accessory muscles , No Rales No Rhonchi GI:  +BS in all four quadrants, soft, non-tender Extremities:  No edema; 2+ dorsalis pedis pulses bilaterally Neuro: Poor cooperation full neuro exam cannot be done Medications: Current Facility-Administered Medications Medication Dose Route Frequency  aspirin delayed-release tablet 81 mg  81 mg Oral DAILY  atorvastatin (LIPITOR) tablet 10 mg  10 mg Oral DAILY  dorzolamide (TRUSOPT) 2 % ophthalmic solution 1 Drop  1 Drop Both Eyes TID  cilostazoL (PLETAL) tablet 100 mg  100 mg Oral ACB&D  clopidogreL (PLAVIX) tablet 75 mg  75 mg Oral DAILY  DULoxetine (CYMBALTA) capsule 20 mg  20 mg Oral DAILY  timolol (TIMOPTIC) 0.5 % ophthalmic solution 1 Drop  1 Drop Both Eyes BID  memantine (NAMENDA) tablet 10 mg  10 mg Oral DAILY  pantoprazole (PROTONIX) tablet 20 mg  20 mg Oral ACB  polyethylene glycol (MIRALAX) packet 17 g  17 g Oral DAILY PRN  
 ondansetron (ZOFRAN ODT) tablet 4 mg  4 mg Oral Q8H PRN Or  
 ondansetron (ZOFRAN) injection 4 mg  4 mg IntraVENous Q6H PRN  
 heparin (porcine) injection 5,000 Units  5,000 Units SubCUTAneous Q12H  cefTRIAXone (ROCEPHIN) 1 g in sterile water (preservative free) 10 mL IV syringe  1 g IntraVENous Q24H  
 metoprolol tartrate (LOPRESSOR) tablet 25 mg  25 mg Oral Q12H  
 dextrose 5% infusion  75 mL/hr IntraVENous CONTINUOUS  
 LORazepam (ATIVAN) injection 1 mg  1 mg IntraVENous Q6H PRN Labs:   
Recent Labs 11/05/20 
1705 11/05/20 
1333 WBC 6.7 6.2 HGB 14.2 13.9 HCT 43.4 43.6  265 Recent Labs 11/05/20 
1705 11/05/20 
1333 * 164* K 3.5 3.3*  
* 123* CO2 28 30 * 162* BUN 90* 89* CREA 2.99* 2.81* CA 9.4 9.8 ALB 2.6*  --   
ALT 14  -- Disclaimer: Sections of this note are dictated utilizing voice recognition software, which may have resulted in some phonetic based errors in grammar and contents. Even though attempts were made to correct all the mistakes, some may have been missed, and remained in the body of the document. If questions arise, please contact our department. Signed By: Emerson Gowers, MD   
 November 6, 2020

## 2020-11-06 NOTE — PROGRESS NOTES
The patient was not able to be spiritually assessed. Provided a ministry of presence and offered silent prayers on the patient's behalf. 's will follow up as needed and as requested. Saima.  Spiritual Care  
[phone]

## 2020-11-06 NOTE — PROGRESS NOTES
Comprehensive Nutrition Assessment Type and Reason for Visit: Initial(screen: BMI) Nutrition Recommendations/Plan: - Add supplement: Ensure Enlive once daily - Monitor po intake and diet tolerance Nutrition Assessment:  Pt off unit at time of visit. Has dementia. Underweight. On soft solid diet. Unable to assess po intake. BM today. Palliative following, addressing goals of care; family decided on limited additional interventions, no feeding tube Malnutrition Assessment: 
Malnutrition Status: At risk for malnutrition (specify)(underweight, dementia) Nutrition History and Allergies: Past medical hx:  Dementia, alzheimer's disease, HTN, reflux. Noted 2 lb, 2% x 4 months PTA per chart hx; question accuracy of wt in 2/2020. No known food allergies Estimated Daily Nutrient Needs: 
Energy (kcal): 8395-4644; Weight Used for Energy Requirements: Admission(43.5 kg) Protein (g):  ; Weight Used for Protein Requirements: Admission(x1-1.2) Fluid (ml/day): 3297-2265; Method Used for Fluid Requirements: 1 ml/kcal 
 
 
Nutrition Related Findings:  BM 11/6.    no edema. IVF:  D5 at 75 mL/hr (90 gm dextrose, 306 kcal per day) Wounds:   
None Current Nutrition Therapies: DIET CARDIAC Soft Solids Anthropometric Measures: 
· Height:  5' 3\" (160 cm) · Current Body Wt:  43.5 kg (95 lb 14.4 oz) · Admission Body Wt:  95 lb 14.4 oz · Usual Body Wt:  44.5 kg (98 lb)(per chart hx) · Ideal Body Wt:  115 lbs:  83.4 % · BMI Category:  Underweight (BMI less than 22) age over 72 Nutrition Diagnosis:  
· Underweight related to inadequate protein-energy intake as evidenced by BMI Nutrition Interventions:  
Food and/or Nutrient Delivery: Continue current diet, Start oral nutrition supplement, IV fluid delivery Nutrition Education and Counseling: Education not indicated(dementia) Coordination of Nutrition Care: Continue to monitor while inpatient Goals: PO nutrition intake will meet >75% of patient's estimated nutrition needs within the next 7 days Nutrition Monitoring and Evaluation:  
Behavioral-Environmental Outcomes: None identified Food/Nutrient Intake Outcomes: Diet advancement/tolerance, Food and nutrient intake, Supplement intake, IVF intake Physical Signs/Symptoms Outcomes: Meal time behavior, Nutrition focused physical findings Discharge Planning: Too soon to determine Electronically signed by Elo Paz RD on 11/6/2020 at 4:43 PM 
 
Contact: [phone]

## 2020-11-07 NOTE — PROGRESS NOTES
RENAL DAILY PROGRESS NOTE Subjective:  
 
 
Complaint: dementia Overnight events noted IMPRESSION:  
IMPRESSION:  
· BRETT, baseline creatinine around 1 mg per DL earlier this year. She has severe dehydration as well as sepsis. · Hypernatremia from dehydration · Hypokalemia due to cellular shifts · Sepsis, UTI · Dementia PLAN:  
· Her sodium is gradually improving, Add kcl supplementation. continue with D5 drip · Continue antibiotic per primary team. 
· Agree with palliative care team input. Current Facility-Administered Medications Medication Dose Route Frequency  potassium chloride 10 mEq in 100 ml IVPB  10 mEq IntraVENous Q1H  
 aspirin delayed-release tablet 81 mg  81 mg Oral DAILY  atorvastatin (LIPITOR) tablet 10 mg  10 mg Oral DAILY  dorzolamide (TRUSOPT) 2 % ophthalmic solution 1 Drop  1 Drop Both Eyes TID  cilostazoL (PLETAL) tablet 100 mg  100 mg Oral ACB&D  clopidogreL (PLAVIX) tablet 75 mg  75 mg Oral DAILY  DULoxetine (CYMBALTA) capsule 20 mg  20 mg Oral DAILY  timolol (TIMOPTIC) 0.5 % ophthalmic solution 1 Drop  1 Drop Both Eyes BID  memantine (NAMENDA) tablet 10 mg  10 mg Oral DAILY  pantoprazole (PROTONIX) tablet 20 mg  20 mg Oral ACB  polyethylene glycol (MIRALAX) packet 17 g  17 g Oral DAILY PRN  
 ondansetron (ZOFRAN ODT) tablet 4 mg  4 mg Oral Q8H PRN Or  
 ondansetron (ZOFRAN) injection 4 mg  4 mg IntraVENous Q6H PRN  
 heparin (porcine) injection 5,000 Units  5,000 Units SubCUTAneous Q12H  cefTRIAXone (ROCEPHIN) 1 g in sterile water (preservative free) 10 mL IV syringe  1 g IntraVENous Q24H  
 metoprolol tartrate (LOPRESSOR) tablet 25 mg  25 mg Oral Q12H  
 dextrose 5% infusion  75 mL/hr IntraVENous CONTINUOUS  
 LORazepam (ATIVAN) injection 1 mg  1 mg IntraVENous Q6H PRN Objective:  
 
Patient Vitals for the past 24 hrs: 
 Temp Pulse Resp BP SpO2 11/07/20 0759 96.9 °F (36.1 °C) 70 19 101/63 98 % 11/07/20 0414 97.4 °F (36.3 °C) (!) 59 17 (!) 159/76 99 % 11/07/20 0003 97.4 °F (36.3 °C) (!) 101 17 128/67 99 % 11/06/20 1955 97.6 °F (36.4 °C) (!) 108 17 115/66 98 % 11/06/20 1925     98 % 11/06/20 1559 97.4 °F (36.3 °C) 72 16 124/71 98 % 11/06/20 1415    139/68   
11/06/20 1150 98 °F (36.7 °C) 90 16 111/64 99 % Weight change: 0 kg (0 lb) 11/05 1901 - 11/07 0700 In: 0 Out: 1 [Urine:1] Intake/Output Summary (Last 24 hours) at 11/7/2020 0920 Last data filed at 11/7/2020 1787 Gross per 24 hour Intake 0 ml Output 1 ml Net -1 ml Physical Exam:  
 
HEENT sclera anicteric, supple neck, no thyromegaly CVS: S1S2 heard,  no rub RS: + air entry b/l, Abd: Soft, Non tender, Not distended, Positive bowel sounds, no organomegaly, no CVA / supra pubic tenderness Neuro: dementia Skin: no visible  Rash Musculoskeletal: contracted Data Review:  
 
LABS:  
Hematology:  
Recent Labs 11/07/20 
0020 11/05/20 
1705 11/05/20 
1333 WBC 6.7 6.7 6.2 HGB 11.1* 14.2 13.9 HCT 34.6* 43.4 43.6 Chemistry:  
Recent Labs 11/07/20 
0020 11/05/20 
1705 11/05/20 
1333 BUN 64* 90* 89* CREA 1.96* 2.99* 2.81* CA 8.9 9.4 9.8 ALB 2.2* 2.6*  --   
K 3.3* 3.5 3.3* * 160* 164* * 121* 123* CO2 26 28 30 * 141* 162* Procedures/imaging: see electronic medical records for all procedures, Xrays and details which were not copied into this note but were reviewed prior to creation of Plan Donald Tavares MD 
11/7/2020 
9:20 AM

## 2020-11-07 NOTE — PROGRESS NOTES
Pt is pulling tele leads off repeatedly. Primary nurse educated her to not pull at tele leads and IVs. Pt pulled out IV in L arm. Ace bandage was wrapped around IV on R forearm to avoid it getting pulled out.

## 2020-11-07 NOTE — PROGRESS NOTES
[facility] Hospitalist Group Progress Note Patient: Castro Lyles Age: 80 y.o. : 1924 MR#: [id number] SSN: [id number] Date/Time: 2020 C/C: AMS Subjective: HPI : Elderly patient from NH / severe dehydration with BRETT - UTI - POA , Afib Patient seen and evaluated, lying in bed, no acute distress, patient does not respond to verbal commands. Is arousable but does not participate in any activities. Assessment/Plan: 1. Acute metabolic encephalopathy likely secondary to UTI 2 Hypernatremialikely secondary to dehydration, continue IVF, nephrology on board 3 UTI - POA - / acute cystitis 4 BRETT - likley pre renal - nephrology consulted 5 dementia 
10 advanced age 7 mild elevated troponin-secondary to BRETT #high trending flat Plan 
-Supportive care -IV hydration monitoring electrolytes and renal function 
-Renal input reviewed 
-Continue Rocephin for UTIurine culture pending Palliative care consult and note reviewed. I spoke with Krysmanny Cobos and explained that patient was not doing much activity. She verbalized understanding. Advised that we would try and continue to treat her for now and see how she responds. Time spent on direct patient care >30 mints Complexity : High complex - due to multiple medical issues outlined above. CODE Status : DNR Case discussed with:  []Patient  [] Family  [x]Nursing  [x]Case Management DVT Prophylaxis:  []Lovenox  [x]Hep SQ  []SCDs  []Coumadin   []On Heparin gtt Objective:  
VS:  
Visit Vitals /63 Pulse 70 Temp 96.9 °F (36.1 °C) Resp 19 Ht 5' 3\" (1.6 m) Wt 43.5 kg (96 lb) SpO2 98% BMI 17.01 kg/m² Tmax/24hrs: Temp (24hrs), Av.3 °F (36.3 °C), Min:96.9 °F (36.1 °C), Max:97.6 °F (36.4 °C) IOBRIEF Intake/Output Summary (Last 24 hours) at 2020 1308 Last data filed at 2020 1909 Gross per 24 hour Intake 0 ml Output 1 ml Net -1 ml  
 
 
 General: Awake but orientation is questionable noncooperative no behavioral issue HEENT: No facial asymmetry, MARION Dillon, External ears - WNL Cardiovascular: S1S2 - regular , No Murmur Pulmonary: Equal expansion , No Use of accessory muscles , No Rales No Rhonchi GI:  +BS in all four quadrants, soft, non-tender Extremities:  No edema; 2+ dorsalis pedis pulses bilaterally Neuro: Poor cooperation full neuro exam cannot be done Medications:  
Current Facility-Administered Medications Medication Dose Route Frequency  aspirin delayed-release tablet 81 mg  81 mg Oral DAILY  atorvastatin (LIPITOR) tablet 10 mg  10 mg Oral DAILY  dorzolamide (TRUSOPT) 2 % ophthalmic solution 1 Drop  1 Drop Both Eyes TID  cilostazoL (PLETAL) tablet 100 mg  100 mg Oral ACB&D  clopidogreL (PLAVIX) tablet 75 mg  75 mg Oral DAILY  DULoxetine (CYMBALTA) capsule 20 mg  20 mg Oral DAILY  timolol (TIMOPTIC) 0.5 % ophthalmic solution 1 Drop  1 Drop Both Eyes BID  memantine (NAMENDA) tablet 10 mg  10 mg Oral DAILY  pantoprazole (PROTONIX) tablet 20 mg  20 mg Oral ACB  polyethylene glycol (MIRALAX) packet 17 g  17 g Oral DAILY PRN  
 ondansetron (ZOFRAN ODT) tablet 4 mg  4 mg Oral Q8H PRN Or  
 ondansetron (ZOFRAN) injection 4 mg  4 mg IntraVENous Q6H PRN  
 heparin (porcine) injection 5,000 Units  5,000 Units SubCUTAneous Q12H  cefTRIAXone (ROCEPHIN) 1 g in sterile water (preservative free) 10 mL IV syringe  1 g IntraVENous Q24H  
 metoprolol tartrate (LOPRESSOR) tablet 25 mg  25 mg Oral Q12H  
 dextrose 5% infusion  75 mL/hr IntraVENous CONTINUOUS  
 LORazepam (ATIVAN) injection 1 mg  1 mg IntraVENous Q6H PRN Labs:   
Recent Labs 11/07/20 
0020 11/05/20 
1705 11/05/20 
1333 WBC 6.7 6.7 6.2 HGB 11.1* 14.2 13.9 HCT 34.6* 43.4 43.6  226 265 Recent Labs 11/07/20 
0020 11/05/20 
1705 11/05/20 
1333 * 160* 164* K 3.3* 3.5 3.3*  
* 121* 123* CO2 26 28 30 * 141* 162* BUN 64* 90* 89* CREA 1.96* 2.99* 2.81* CA 8.9 9.4 9.8 ALB 2.2* 2.6*  --   
ALT 14 14  -- Total time28 minutes Disclaimer: Sections of this note are dictated utilizing voice recognition software, which may have resulted in some phonetic based errors in grammar and contents. Even though attempts were made to correct all the mistakes, some may have been missed, and remained in the body of the document. If questions arise, please contact our department. Signed By: Kinsey Larios MD   
 November 7, 2020

## 2020-11-07 NOTE — PROGRESS NOTES
Bedside and Verbal shift change report given to Becca Chacon RN (oncoming nurse) by Orion Plasencia RN (offgoing nurse). Report included the following information SBAR, Kardex and MAR.

## 2020-11-07 NOTE — PROGRESS NOTES
1925: Assumed patient care from [address] [place] [address]. Patient is alert, but disoriented to person, place, time and situation. Respiratory status is stable on room air. Vital signs are stable. MEWS score is a two. Patient is unable to deny any pain, discomfort, nausea vomiting dizziness or anxiety. Her FLACC score is a zero. White board and fall card is updated. Bed is locked and in lowest position. Call bell, water and personal belongings are within reach. Patient is unable to verbalize any questions, comments or concerns after bedside shift report. 2100: Patient is refusing all oral medications. 2200: Patient continues to refuse all oral medications. 0700: Patient was noncompliant and refused all oral medications this shift. Respiratory status, vital signs and MEWS score remained stable. Patient was resting quietly with no signs of distress noted. Bed locked and in lowest position. Call bell water and personal belongings were within reach. Patient had no questions, comments or concerns after bedside shift report.  Bedside report given to [facility].

## 2020-11-07 NOTE — PROGRESS NOTES
[facility]  -  Progress Note Patient: Davide Gaming MRN: [id number]  SSN: [id number] YOB: 1924  Age: 80 y.o. Sex: female Admit Date: 11/5/2020 Assessment:  
 
-Presented from NH with decreased oral intake over the previous week. -Atrial fibrillation with RVR, new diagnosis in setting of below. Poor candidate for aggressive NOAC, plan ASA for now. 
-Indeterminate troponin, follow up pending, suspect secondary demand ischemia in setting of below. 
-Abnormal ECG with ST depressions in setting of tachycardia and hypotension. 
-Echo 11/6/2020: EF 50-55%, mild concentric LVH with small left ventricle, normal LV wall motion, inconclusive diastolic function 
-Urinary tract infection. 
-Acute kidney injury. 
-Hypernatremia. 
-Hypokalemia. -Hypertension. -H/o PAD. 
-H/o CVA. -Dementia. -DNR/DNI status. 
  
No primary cardiologist. 
 
Plan:  
 
-Continue PO medications as able, pt apparently has refused PO medications so far, HR improving with hydration. -Appreciate assistance of palliative team. 
-Would not pursue further cardiac workup at this time. Subjective: No apparent complaint. Objective:  
  
Patient Vitals for the past 8 hrs: 
 Temp Pulse Resp BP SpO2  
11/07/20 0759 96.9 °F (36.1 °C) 70 19 101/63 98 % Patient Vitals for the past 96 hrs: 
 Weight 11/07/20 0414 43.5 kg (96 lb) 11/06/20 1925 43.5 kg (95 lb 14.4 oz) 11/06/20 1415 43.5 kg (96 lb) 11/06/20 0412 43.5 kg (96 lb) Intake/Output Summary (Last 24 hours) at 11/7/2020 1216 Last data filed at 11/7/2020 6446 Gross per 24 hour Intake 0 ml Output 1 ml Net -1 ml Physical Exam: 
General:  alert, cooperative, no distress, appears stated age Neck:  supple Lungs:  clear to auscultation bilaterally Heart:  irregular rhythm Abdomen:  abdomen is soft without significant tenderness, masses, organomegaly or guarding Extremities:  Atraumatic, no edema Data Review:  
 
Labs: Results:  
   
Chemistry Recent Labs 11/07/20 
0020 11/05/20 
1705 11/05/20 
1333 * 141* 162* * 160* 164* K 3.3* 3.5 3.3*  
* 121* 123* CO2 26 28 30 BUN 64* 90* 89* CREA 1.96* 2.99* 2.81* CA 8.9 9.4 9.8 AGAP 8 11 11 BUCR 33* 30* 32* AP 71 89  --   
TP 5.5* 6.7  --   
ALB 2.2* 2.6*  --   
GLOB 3.3 4.1*  --   
AGRAT 0.7* 0.6*  --   
  
CBC w/Diff Recent Labs 11/07/20 
0020 11/05/20 
1705 11/05/20 
1333 WBC 6.7 6.7 6.2 RBC 3.56* 4.40 4.39  
HGB 11.1* 14.2 13.9 HCT 34.6* 43.4 43.6  226 265 GRANS 62 75* 60  
LYMPH 22 15* 29 EOS 4 0 1 Cardiac Enzymes Lab Results Component Value Date/Time TROIQ 0.28 (H) 11/07/2020 12:20 AM  
 TROIQ 0.30 (H) 11/06/2020 06:20 PM  
  
Liver Enzymes Recent Labs 11/07/20 
0020 TP 5.5* ALB 2.2* AP 71

## 2020-11-07 NOTE — ROUTINE PROCESS
Bedside shift change report given to Ba Zee RN (oncoming nurse) by Silke Dunlap RN (offgoing nurse). Report included the following information SBAR, Kardex, MAR and Cardiac Rhythm Afib.

## 2020-11-08 NOTE — PROGRESS NOTES
[facility]  -  Progress Note Patient: Joan Vee MRN: [id number]  SSN: [id number] YOB: 1924  Age: 80 y.o. Sex: female Admit Date: 11/5/2020 Assessment:  
 
-Presented from NH with decreased oral intake over the previous week. -Atrial fibrillation with RVR, new diagnosis in setting of below.  Poor candidate for aggressive NOAC, plan ASA for now. 
-Indeterminate troponin, follow up pending, suspect secondary demand ischemia in setting of below. 
-Abnormal ECG with ST depressions in setting of tachycardia and hypotension. 
-Echo 11/6/2020: EF 50-55%, mild concentric LVH with small left ventricle, normal LV wall motion, inconclusive diastolic function 
-Urinary tract infection. 
-Acute kidney injury. 
-Hypernatremia. 
-Hypokalemia. -Hypertension. -H/o PAD. 
-H/o CVA. -Dementia. -DNR/DNI status. 
  
No primary cardiologist. 
 
Plan:  
 
-Remains in afib, continue PO Lopressor as able/tolerated. -No further cardiac evaluation planned. Will be available as needed if additional concerns arise. Subjective: No apparent complaints. Objective:  
  
Patient Vitals for the past 8 hrs: 
 Temp Pulse Resp BP SpO2  
11/08/20 0746 97.5 °F (36.4 °C) 76 18 (!) 107/52 98 % 11/08/20 0400 97.4 °F (36.3 °C) 90 18 (!) 157/78 99 % Patient Vitals for the past 96 hrs: 
 Weight 11/07/20 0414 43.5 kg (96 lb) 11/06/20 1925 43.5 kg (95 lb 14.4 oz) 11/06/20 1415 43.5 kg (96 lb) 11/06/20 0412 43.5 kg (96 lb) Intake/Output Summary (Last 24 hours) at 11/8/2020 1050 Last data filed at 11/7/2020 1908 Gross per 24 hour Intake 950 ml Output  Net 950 ml Physical Exam: 
General:  alert, no distress, appears stated age Neck:  supple Lungs:  clear to auscultation bilaterally Heart:  Irregularly irregular rhythm Abdomen:  abdomen is soft without significant tenderness, masses, organomegaly or guarding Extremities:  Atraumatic, no edema Data Review:  
 
Labs: Results:  
   
Chemistry Recent Labs 11/08/20 
0127 11/07/20 
0020 11/05/20 
1705 GLU 98 213* 141* * 158* 160*  
K 3.5 3.3* 3.5 * 124* 121* CO2 29 26 28 BUN 49* 64* 90* CREA 1.40* 1.96* 2.99* CA 9.0 8.9 9.4 AGAP 6 8 11 BUCR 35* 33* 30* AP 77 71 89  
TP 5.5* 5.5* 6.7 ALB 2.1* 2.2* 2.6*  
GLOB 3.4 3.3 4.1* AGRAT 0.6* 0.7* 0.6* CBC w/Diff Recent Labs 11/08/20 
0127 11/07/20 
0020 11/05/20 
1705 WBC 5.4 6.7 6.7  
RBC 3.60* 3.56* 4.40 HGB 11.2* 11.1* 14.2 HCT 34.6* 34.6* 43.4  180 226 GRANS 55 62 75* LYMPH 27 22 15* EOS 5 4 0 Liver Enzymes Recent Labs 11/08/20 
0127 TP 5.5* ALB 2.1* AP 77

## 2020-11-08 NOTE — PROGRESS NOTES
Bedside shift change report given to [address] (oncoming nurse) by Eric Mccarty (offgoing nurse). Report included the following information SBAR.

## 2020-11-08 NOTE — PROGRESS NOTES
Pt becomes physically aggressive when re-attempting to obtain IV access and continues to pull at lines.  notified. Pt is eating and drinking with when assisted. Will pass on to day team. No orders given at this time. Will continue to monitor.

## 2020-11-08 NOTE — PROGRESS NOTES
RENAL DAILY PROGRESS NOTE Subjective:  
 
 
Complaint: dementia Overnight events noted IMPRESSION:  
IMPRESSION:  
· BRETT, baseline creatinine around 1 mg per DL earlier this year. She has severe dehydration as well as sepsis. · Hypernatremia from dehydration · Hypokalemia due to cellular shifts · Sepsis, UTI · Dementia PLAN:  
· Her IV infiltrated and she is refusing IVs, Meds, Po intake, Water intake etc. In this scenario the best course of action is hospice . Can continue with iv fluids once IV is established. · Continue antibiotic per primary team. 
· Agree with palliative care team input. Current Facility-Administered Medications Medication Dose Route Frequency  aspirin delayed-release tablet 81 mg  81 mg Oral DAILY  atorvastatin (LIPITOR) tablet 10 mg  10 mg Oral DAILY  dorzolamide (TRUSOPT) 2 % ophthalmic solution 1 Drop  1 Drop Both Eyes TID  cilostazoL (PLETAL) tablet 100 mg  100 mg Oral ACB&D  clopidogreL (PLAVIX) tablet 75 mg  75 mg Oral DAILY  DULoxetine (CYMBALTA) capsule 20 mg  20 mg Oral DAILY  timolol (TIMOPTIC) 0.5 % ophthalmic solution 1 Drop  1 Drop Both Eyes BID  memantine (NAMENDA) tablet 10 mg  10 mg Oral DAILY  pantoprazole (PROTONIX) tablet 20 mg  20 mg Oral ACB  polyethylene glycol (MIRALAX) packet 17 g  17 g Oral DAILY PRN  
 ondansetron (ZOFRAN ODT) tablet 4 mg  4 mg Oral Q8H PRN Or  
 ondansetron (ZOFRAN) injection 4 mg  4 mg IntraVENous Q6H PRN  
 heparin (porcine) injection 5,000 Units  5,000 Units SubCUTAneous Q12H  cefTRIAXone (ROCEPHIN) 1 g in sterile water (preservative free) 10 mL IV syringe  1 g IntraVENous Q24H  
 metoprolol tartrate (LOPRESSOR) tablet 25 mg  25 mg Oral Q12H  
 dextrose 5% infusion  75 mL/hr IntraVENous CONTINUOUS  
 LORazepam (ATIVAN) injection 1 mg  1 mg IntraVENous Q6H PRN Objective:  
 
Patient Vitals for the past 24 hrs: 
 Temp Pulse Resp BP SpO2 11/08/20 0746 97.5 °F (36.4 °C) 76 18 (!) 107/52 98 % 11/08/20 0400 97.4 °F (36.3 °C) 90 18 (!) 157/78 99 % 11/08/20 0000 97.5 °F (36.4 °C) 90 18 139/65 97 % 11/07/20 2000 97.8 °F (36.6 °C) 90 17 (!) 158/69 96 % 11/07/20 1532 97.6 °F (36.4 °C) 86 16 115/66 97 % 11/07/20 1200 97 °F (36.1 °C) (!) 101 17 101/66 99 % Weight change:  
 
 11/06 1901 - 11/08 0700 In: 950 [P.O.:50; I.V.:900] Out: 1 [Urine:1] Intake/Output Summary (Last 24 hours) at 11/8/2020 1125 Last data filed at 11/7/2020 1908 Gross per 24 hour Intake 950 ml Output  Net 950 ml Physical Exam:  
 
HEENT sclera anicteric, supple neck, no thyromegaly CVS: S1S2 heard,  no rub RS: + air entry b/l, Abd: Soft, Non tender, Not distended, Positive bowel sounds, no organomegaly, no CVA / supra pubic tenderness Neuro: dementia Skin: no visible  Rash Musculoskeletal: contracted Data Review:  
 
LABS:  
Hematology:  
Recent Labs 11/08/20 0127 11/07/20 
0020 11/05/20 
1705 11/05/20 
1333 WBC 5.4 6.7 6.7 6.2 HGB 11.2* 11.1* 14.2 13.9 HCT 34.6* 34.6* 43.4 43.6 Chemistry:  
Recent Labs 11/08/20 
0127 11/07/20 
0020 11/05/20 
1705 11/05/20 
1333 BUN 49* 64* 90* 89* CREA 1.40* 1.96* 2.99* 2.81* CA 9.0 8.9 9.4 9.8 ALB 2.1* 2.2* 2.6*  --   
K 3.5 3.3* 3.5 3.3* * 158* 160* 164* * 124* 121* 123* CO2 29 26 28 30 GLU 98 213* 141* 162* Procedures/imaging: see electronic medical records for all procedures, Xrays and details which were not copied into this note but were reviewed prior to creation of Plan Wai Lynn MD 
11/8/2020

## 2020-11-08 NOTE — PROGRESS NOTES
[facility] Hospitalist Group Progress Note Patient: Castro Lyles Age: 80 y.o. : 1924 MR#: [id number] SSN: [id number] Date/Time: 2020 C/C: AMS Subjective: HPI : Elderly patient from NH / severe dehydration with BRETT - UTI - POA , Afib Patient seen and evaluated, lying in bed, no acute distress, patient is more awake today, is trying to climb out of bed. Nurse mentions she did eat something today. Assessment/Plan: 1. Acute metabolic encephalopathy likely secondary to UTI 2 Hypernatremialikely secondary to dehydration, continue IVF, nephrology on board 3 UTI - POA - / acute cystitis 4 BRETT - likley pre renal - nephrology consulted 5 dementia 
10 advanced age 7 mild elevated troponin-secondary to BRETT #high trending flat Plan 
-Supportive care -IV hydration monitoring electrolytes and renal function 
-Renal input reviewed UTIculture growing E. coli and Klebsiella, sensitive to p.o. Cipro, will replace IV Rocephin Palliative care consult and note reviewed. I spoke with Krys Cobos and explained that patient was not doing much activity. She verbalized understanding. Advised that we would try and continue to treat her for now and see how she responds. Time spent on direct patient care >30 mints Complexity : High complex - due to multiple medical issues outlined above. CODE Status : DNR Case discussed with:  []Patient  [] Family  [x]Nursing  [x]Case Management DVT Prophylaxis:  []Lovenox  [x]Hep SQ  []SCDs  []Coumadin   []On Heparin gtt Objective:  
VS:  
Visit Vitals /62 (BP 1 Location: Right arm, BP Patient Position: Supine) Pulse 68 Temp 97.4 °F (36.3 °C) Resp 19 Ht 5' 3\" (1.6 m) Wt 43.5 kg (96 lb) SpO2 95% BMI 17.01 kg/m² Tmax/24hrs: Temp (24hrs), Av.5 °F (36.4 °C), Min:97.4 °F (36.3 °C), Max:97.8 °F (36.6 °C) IOBRIEF Intake/Output Summary (Last 24 hours) at 2020 1254 Last data filed at 11/7/2020 1908 Gross per 24 hour Intake 950 ml Output  Net 950 ml General: Awake but orientation is questionable noncooperative no behavioral issue HEENT: No facial asymmetry, MARION Dillon, External ears - WNL Cardiovascular: S1S2 - regular , No Murmur Pulmonary: Equal expansion , No Use of accessory muscles , No Rales No Rhonchi GI:  +BS in all four quadrants, soft, non-tender Extremities:  No edema; 2+ dorsalis pedis pulses bilaterally Neuro: Poor cooperation full neuro exam cannot be done Medications:  
Current Facility-Administered Medications Medication Dose Route Frequency  aspirin delayed-release tablet 81 mg  81 mg Oral DAILY  atorvastatin (LIPITOR) tablet 10 mg  10 mg Oral DAILY  dorzolamide (TRUSOPT) 2 % ophthalmic solution 1 Drop  1 Drop Both Eyes TID  cilostazoL (PLETAL) tablet 100 mg  100 mg Oral ACB&D  clopidogreL (PLAVIX) tablet 75 mg  75 mg Oral DAILY  DULoxetine (CYMBALTA) capsule 20 mg  20 mg Oral DAILY  timolol (TIMOPTIC) 0.5 % ophthalmic solution 1 Drop  1 Drop Both Eyes BID  memantine (NAMENDA) tablet 10 mg  10 mg Oral DAILY  pantoprazole (PROTONIX) tablet 20 mg  20 mg Oral ACB  polyethylene glycol (MIRALAX) packet 17 g  17 g Oral DAILY PRN  
 ondansetron (ZOFRAN ODT) tablet 4 mg  4 mg Oral Q8H PRN Or  
 ondansetron (ZOFRAN) injection 4 mg  4 mg IntraVENous Q6H PRN  
 heparin (porcine) injection 5,000 Units  5,000 Units SubCUTAneous Q12H  cefTRIAXone (ROCEPHIN) 1 g in sterile water (preservative free) 10 mL IV syringe  1 g IntraVENous Q24H  
 metoprolol tartrate (LOPRESSOR) tablet 25 mg  25 mg Oral Q12H  
 dextrose 5% infusion  75 mL/hr IntraVENous CONTINUOUS  
 LORazepam (ATIVAN) injection 1 mg  1 mg IntraVENous Q6H PRN Labs:   
Recent Labs 11/08/20 
0127 11/07/20 
0020 11/05/20 
1705 WBC 5.4 6.7 6.7 HGB 11.2* 11.1* 14.2 HCT 34.6* 34.6* 43.4  180 226 Recent Labs 11/08/20 0127 11/07/20 
0020 11/05/20 
1705 * 158* 160*  
K 3.5 3.3* 3.5 * 124* 121* CO2 29 26 28 GLU 98 213* 141* BUN 49* 64* 90* CREA 1.40* 1.96* 2.99* CA 9.0 8.9 9.4 ALB 2.1* 2.2* 2.6* ALT 19 14 14 Total time28 minutes Disclaimer: Sections of this note are dictated utilizing voice recognition software, which may have resulted in some phonetic based errors in grammar and contents. Even though attempts were made to correct all the mistakes, some may have been missed, and remained in the body of the document. If questions arise, please contact our department. Signed By: Bernabe Kim MD   
 November 8, 2020

## 2020-11-09 NOTE — PROGRESS NOTES
Bedside shift change report given to [address] (oncoming nurse) by Hans Foley (offgoing nurse). Report included the following information SBAR, MAR and Recent Results: including critical potassium of 2.7. Text page sent to hospitalist. No new orders given as of yet.  Information also given in report to follow up with day team.

## 2020-11-09 NOTE — ROUTINE PROCESS
Report given to Jayant Diaz at [facility]. Report consisted of SBAR, MAR, and Intake/Output. Patient transported at 1500.

## 2020-11-09 NOTE — ROUTINE PROCESS
Bedside shift change report given to Carter Parra RN (oncoming nurse) by Sherice Romano RN (offgoing nurse). Report included the following information SBAR, Kardex, MAR and Cardiac Rhythm Afib.

## 2020-11-09 NOTE — PROGRESS NOTES
Nutrition Assessment Type and Reason for Visit: Reassess(screen: BMI) Nutrition Recommendations/Plan:  
- downgrade diet consistency to mechanical soft for easier feeding/ encouragement of po intake - Modify supplements: change to Magic Cup BID, Ensure Pudding BID 
- Encourage po intake of meals and supplements. Assistance as needed Nutrition Assessment:  Pt asleep/lethargic at time of visit. breakfast tray was at bedside; ate 0% of breakfast. Only eats a few bites from meals and drinks few sips from Ensure drinks with assistance per RN report. Refusing medications at times. K low, being replaced. NFPE, limited due to pt positioning; moderate muscle loss in calves, mild muscle loss in temples. Malnutrition Assessment: 
Malnutrition Status: At risk for malnutrition (specify)(underweight, dementia) Estimated Daily Nutrient Needs: 
Energy (kcal):  1924-6588 Protein (g):  44-52 Fluid (ml/day):  1091-9426 Nutrition Related Findings:  BM 11/8. IVF: D5 at 75 mL/hr (120 gm dextrose, 408 kcal per day). Palliative following, addressing goals of care; family decided on limited additional interventions, no feeding tube Current Nutrition Therapies: DIET CARDIAC Soft Solids DIET NUTRITIONAL SUPPLEMENTS Lunch; Ensure [employer] Anthropometric Measures: 
· Height:  5' 3\" (160 cm) · Current Body Wt:  43.5 kg (95 lb 14.4 oz) · BMI: 17 
 
Nutrition Diagnosis:  
· Underweight related to inadequate protein-energy intake as evidenced by BMI Nutrition Intervention: 
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 
PO nutrition intake will meet >75% of patient's estimated nutrition needs within the next 7 days. Provide nutrition interventions consistent with plan of care goals Nutrition Monitoring and Evaluation:  
Behavioral-Environmental Outcomes: None identified Food/Nutrient Intake Outcomes: Diet advancement/tolerance, Food and nutrient intake, Supplement intake, Vitamin/mineral intake, IVF intake Physical Signs/Symptoms Outcomes: Biochemical data, Chewing or swallowing, Meal time behavior, Nutrition focused physical findings Discharge Planning:   
Continue oral nutrition supplement, Continue current diet Electronically signed by Leida Santana RD on 11/9/2020 at 12:29 PM 
 
Contact Number: [phone]

## 2020-11-09 NOTE — ACP (ADVANCE CARE PLANNING)
Palliative Medicine 
  
Advanced Steps Advance Care Planning Conversation [facility] (Physician Orders for Scope of Treatment) 
             
  
Date of conversation: 11/9/2020                    Location: [facility] Length (minutes): 30 
  
 
[]? Other Surrogate Decision Maker / Next of Kin Name: Michael Alexander Relationship to Patient: Samuel Phone Number: [phone] 
  
Advanced Steps® ACP Facilitator: Олег Loving RN   
  
Conversation Topics  
  
Per pt's niece Michael Alexander, the pt was never  and had no children. She does have 2 sisters (80 and 80) but both are chronically ill and unable to participate in medical decision making for the pt. This would make the pt's niece Michael Alexander as legal NOK. There is a General POA paperwork on file but it does not include a medical clause. Aminah Ballesteros is legal NOK. Pt has had dementia for approx the last 8 years and it has been significantly worsening over the past few months. Pt does have a DDNR that [address] faxed over to us. Discussed benefits and burdens of intubation and feeding tubes. Aminah Ballesteros is in agreement with making the pt DNR/DNI and no feeding tubes. POST form completed.    
Needs to discuss with spiritual/Yarsani advisor: []? Yes  [x]? No 
  
Needs more information about illness and complications:  []? Yes  [x]? No 
  
  
Cardiopulmonary Resuscitation   
  
Order Elected for CPR:  []? Attempt Resuscitation   [x]? Do Not Attempt Resuscitation 
  
  
When NOT in Cardiopulmonary Arrest, Order Elected:   
  
[]? Comfort Measures 
[x]? Limited Additional Interventions []? Full Interventions 
  
Artificially Administered Nutrition, Order Elected: 
  
[x]? No Feeding Tube  
[]? Feeding Tube for a defined trial period []? Feeding Tube long-term if indicated 
  The following was provided (check all that apply): Healthcare Decision Information Cards: []? Help with Breathing Facts []? Tube Feeding Facts []? CPR Facts []? Review of existing Advance Directive 
[]? Assistance with Completion of New Advance Directive [x]? Review of [facility] POST Form                               
  
Meeting Outcomes: 
            [x]? ACP discussion completed 
            [x]? [place] POST form completed 
[x]? Eileen prepared for Provider review and signature [x]? Original placed on Chart, if in facility (form to be sent with patient at discharge) [x]? Copy given to healthcare agent [x]? Copy scanned to electronic medical record 
  
  
Follow-up plan:   
            []? Schedule follow-up conversation to continue planning 
            []? Referred individual to Provider for additional questions/concerns [x]? Advised patient/agent/surrogate to review completed POST form and update if needed with changes in condition, patient preferences or care setting  
  
[]? This note routed to one or more involved healthcare providers 
  
Pt is DNR/DNI. NO feeding tubes.   
  
Thank you for the Palliative Medicine consult and allowing us to participate in the care of Ms. Kristi GUERRERON, RN, [facility] Palliative Medicine Inpatient RN Palliative [place] Line: [phone]

## 2020-11-09 NOTE — PROGRESS NOTES
Physician Progress Note PATIENTCorinn  
CSN #:                  [id number] :                       [id number] ADMIT DATE:       2020 4:47 PM 
[address] [place] DATE: 
RESPONDING 
PROVIDER #:        Robert Calderón MD 
 
 
 
 
QUERY TEXT: 
 
Patient admitted with UTI. Noted documentation of sepsis in progress note on 2020. Please indicate one of the following and document in the medical record: The medical record reflects the following: 
Risk Factors: UTI, BRETT, metabolic encephalopathy, cachexia Clinical Indicators: \"BRETT, baseline creatinine around 1 mg per DL earlier this year. She has severe dehydration as well as sepsis. \"  per Dr. Deepika Woods, progress note, 2020. Na:  155 on 2020 K+:  2.7 on 2020 BUN:  39 on 2020 Cr:  1.08 on 2020 WBC:  3.3 on 2020 Plts:  154 on 2020 T:  97.4 to 98 since admission Heart rate:  110 - 122 on 2020 1720 to 2020 7262 BP min:  97/58 this admission Treatment: ceftriaxone 1gm IV 3 doses given 
ciprofloxacin 500mg po start 2020 Thank you, KAM Ogden RN, MARCIANO Brent [email or web address] Office #:  [phone] Options provided: 
-- Sepsis present as evidenced by, Please document evidence. -- Sepsis was ruled out after study -- Other - I will add my own diagnosis -- Disagree - Not applicable / Not valid -- Disagree - Clinically unable to determine / Unknown 
-- Refer to Clinical Documentation Reviewer PROVIDER RESPONSE TEXT: 
 
Sepsis was ruled out after study. Query created by:  Taiwo Colmenares on 2020 9:05 AM 
 
 
Electronically signed by:  Robert Calderón MD 2020 1:29 PM

## 2020-11-09 NOTE — PROGRESS NOTES
Transition of Care Plan to SNF/Rehab 
 
SNF/Rehab Transition: 
Patient has been accepted to [facility] and meets criteria for admission. Patient will be transported by [facility] and expected to leave at 3pm. 
 
Communication to Patient/Family: Met with patient and niece,  (identified care giver) and they are agreeable to the transition plan. Communication to SNF/Rehab: 
Bedside RN, Lester Maguire, has been notified of the transition plan to the facility and to call report (phone number [phone]). Discharge information has been updated on the AVS. And communicated to facility via [facility], or CC link. SNF/Rehab Transition: PCP/Specialist: facility MD 
 
Nursing Please include all hard scripts for controlled substances, med rec and dc summary, and AVS in packet. Reviewed and confirmed with facility representative, Sabrina Anderson  at [facility] they can manage the patient care needs for the following:  
 
Kinga Mckeon with (X) only those applicable: 
 
Medication: 
[x]  Medications will be available at the facility []  IV Antibiotics []  Controlled Substance - hard copy to be sent with patient  
[]  Weekly Labs Documents: 
[] Hard RX  Number sent  
[] MAR 
[] Kardex [] AVS 
[] Wound care note [x]Transfer Summary/Discharge Summary Equipment: 
[]  CPAP/BiPAP []  Wound Vacuum 
[]  George or Urinary Device 
[]  PICC/Central Line 
[]  Nebulizer  
[]  Ventilator Treatment: 
[]Isolation (for MRSA, VRE, etc.) []Surgical Drain Management []Tracheostomy Care 
[]Dressing Changes []Dialysis with transportation and chair time  Center Mode of tranpsort []PEG Care []Oxygen []Daily Weights for Heart Failure Dietary: 
[]Any diet limitations []Tube Feedings []Total Parenteral Management (TPN) Eligible for Medicaid Long Term Services and Supports Yes: 
[] Eligible for medical assistance or will become eligible within 180 days and LTSS completed. [] Provider/Patient and/or support system has requested screening. 
[] LTSS copy provided to patient or responsible party, . 
[] LTSS unavailable at discharge will send once processed to SNF provider. 
[] LTSS  unavailable at discharged mailed to patient 
[] LTSS performed by outside agency  on  with tracking number No:  
[] Private pay and is not financially eligible for Medicaid within the next 180 days. [] Reside out-of-state. [] A residents of a state owned/operated facility that is licensed 
by [address] [facility] or [facility] 
[] Enrollment in [facility] hospice services 
[] [address] 
[] Patient /Family declines to have screening completed or provide financial information for screening PRIVATE PAY AT [facility] [address] NO LTSS Financial Resources: 
Medicaid   
[] Initiated and application pending  
[] Full coverage Advanced Care Plan: 
[]Surrogate Decision Maker of Care 
[x]DAMIAN Shaffer [x]Communicated Code Status/ sent  (DNR) Other IRLANDA Loja Case Management [phone]

## 2020-11-09 NOTE — PROGRESS NOTES
Problem: Falls - Risk of 
Goal: *Absence of Falls Description: Document [place] Fall Risk and appropriate interventions in the flowsheet. Outcome: Progressing Towards Goal 
Note: Fall Risk Interventions: 
  
 
Mentation Interventions: Adequate sleep, hydration, pain control, Bed/chair exit alarm, Door open when patient unattended, Evaluate medications/consider consulting pharmacy, More frequent rounding, Reorient patient, Toileting rounds, Update white board Medication Interventions: Bed/chair exit alarm, Evaluate medications/consider consulting pharmacy Elimination Interventions: Bed/chair exit alarm, Call light in reach, Toileting schedule/hourly rounds

## 2020-11-09 NOTE — CONSULTS
Palliative Medicine Consult Patient Name: Mariella Drummond YOB: 1924 Date of Initial Consult: 11/6/2020, 11/9/2020 Reason for Consult: Goals of care discussions Requesting Provider: Dimitri Bacon MD 
Primary Care Physician: Julia Crisostomo MD 
  
 SUMMARY:  
Mariella Drummond is a 80 y.o. with a past history of Alzheimer disease, HTN, PAD, and CVA who was admitted on 11/5/2020 from [facility] with a diagnosis of severe dehydration with BRETT, UTI, and A-fib. Current medical issues leading to Palliative Medicine involvement include: support and care decisions. 11/9/2020: Patient is lying in bed asleep, attempted to wake up to encourage breakfast. She responded with some movements but did not wake up enough to feed breakfast. NAD noted. POST form signature received. PALLIATIVE DIAGNOSES:  
1. Goals of care discussions 2. Severe dehydration and malnutrition 3. UTI 4. Dementia 5. Debility PLAN:  
11/9/2020: Patient is lying in bed asleep, attempted to wake up to encourage breakfast. She responded with some movements but did not wake up enough to feed breakfast. NAD noted. Support offered Signed POST form received and on file. Continue current level of care with DNR/DNI, no feeding tubes. See previous discussions below: 
 
11/6/2020: Goals of care discussions: Palliative medicine team including MARY Young RN and I met with patient at patient's bedside. Patient is awake, alert, and oriented to self, pulling clothing and blankets off, trying to get out of bed. Attempted to reorient patient but not successful. Nonverbal, no command following. NAD noted. DDNR on file. Called pt's niece Vadim Corral- confirmed patient has no AMD- the pt was never  and had no children. She does have 2 sisters (80 and 80) but both are chronically ill and unable to participate in medical decision making for the pt per Vadim Corral.   Patient's niece Vadim Corral is legal NOK. Called niece to provide a medical update and discuss goals of care. Discussed the benefits and burdens of CPR in the event of cardiopulmonary arrest in the setting of advanced age, dementia, and other comorbidities. Discussed the benefits and burdens of intubation in the event of respiratory distress pre-arrest. Discussed the benefits and burdens of feeding tubes in the event of dysphagia or nutritional decline. Discussed the benefits and burdens of continuing current level of care versus initiating comfort measures with the support of hospice at discharge. Per Je Scruggs, patient would not want intubation, mechanical ventilation, tube feeding, or CPR for any reason. She would like to continue current level of care with DNR/DNI, no feeding tubes. POST form sent to Je JACOB via [employer] for electronic signature to reflect DNR/DNI, limited interventions, NO feeding tubes, awaiting return of signature- unable to come to hospital to complete POST form due to the visitor restrictions set forth by the hospital in response to COVID-19. Will continue to follow with you. 1. Severe dehydration and malnutrition: Poor PO intake for the past week. Body mass index is 17.01 kg/m². Last albumin 2.6 on 11/5/2020.  
2. UTI: POA. On IVAB and IV fluids. 3. Dementia: FAST score 7B. See number 5. 
4. Debility: pt has been at [facility] for the past month. Her 80year old sister also lives there. She was living with [facility] for the past 8 years but about a month ago her dementia became so bad that she had to be placed at [facility]. Per [address], pt was wandering at home, up all hours of the night, always asking to be taken to the hospital and she required assistance with all ADL's. 
5. Initial consult note routed to primary continuity provider 6. Communicated plan of care with: Palliative IDT, patient's family, RN, MD 
 
 
 GOALS OF CARE / TREATMENT PREFERENCES:  
[====Goals of Care====] GOALS OF CARE:  DNR/DNI, no feeding tubes Patient/Health Care Proxy Stated Goals: Prolong life TREATMENT PREFERENCES:  
Code Status: DNR Advance Care Planning: 
Advance Care Planning 11/6/2020 Patient's Healthcare Decision Maker is: Legal Next of Kin Confirm Advance Directive None Patient Would Like to Complete Advance Directive Unable Does the patient have other document types MOST/MOLST/POST/POLST;Power of [address] Medical Interventions: Limited additional interventions 
continue current level of care with DNR/DNI, no feeding tubes. Artificially Administered Nutrition: No feeding tube The palliative care team has discussed with patient / health care proxy about goals of care / treatment preferences for patient. 
[====Goals of Care====] HISTORY:  
 
History obtained from: patient's family, chart CHIEF COMPLAINT: n/a 
 
HPI/SUBJECTIVE: The patient is:  
[] Verbal and participatory [x] Non-participatory due to:  
Confused, nonverbal, no command following Clinical Pain Assessment (nonverbal scale for severity on nonverbal patients): Adult Nonverbal Pain Scale Face: No particular expression or smile Activity (Movement): Lying quietly, normal position Guarding: Lying quietly, no positioning of hands over areas of body Physiology (Vital Signs): Stable vital signs Respiratory: Baseline RR/SpO2 compliant with ventilator Total Score: 0 
 
 
 FUNCTIONAL ASSESSMENT:  
 
Palliative Performance Scale (PPS): PPS: 40 PSYCHOSOCIAL/SPIRITUAL SCREENING:  
 
Advance Care Planning: 
Advance Care Planning 11/6/2020 Patient's Healthcare Decision Maker is: Legal Next of Kin Confirm Advance Directive None Patient Would Like to Complete Advance Directive Unable Does the patient have other document types MOST/MOLST/POST/POLST;Power of [address] Any spiritual / Pentecostal concerns: unable to assess 
[] Yes /  [] No 
 
Caregiver Burnout: 
 [] Yes /  [] No /  [x] No Caregiver Present Anticipatory grief assessment:  Unable to assess 
[] Normal  / [] Maladaptive REVIEW OF SYSTEMS:  
 
Positive and pertinent negative findings in ROS are noted above in HPI. The following systems were [] reviewed / [x] unable to be reviewed as noted in HPI Other findings are noted below. Systems: constitutional, ears/nose/mouth/throat, respiratory, gastrointestinal, genitourinary, musculoskeletal, integumentary, neurologic, psychiatric, endocrine. Positive findings noted below. Modified ESAS Completed by: provider Stool Occurrence(s): 1 PHYSICAL EXAM:  
 
From RN flowsheet: 
Wt Readings from Last 3 Encounters:  
11/07/20 43.5 kg (96 lb)  
07/16/20 44.5 kg (98 lb)  
03/11/20 44.5 kg (98 lb) Blood pressure (!) 116/54, pulse 68, temperature 97.5 °F (36.4 °C), resp. rate 20, height 5' 3\" (1.6 m), weight 43.5 kg (96 lb), SpO2 94 %. Pain Scale 1: Visual 
Pain Intensity 1: 0 Constitutional: Drowsy, NAD, frail appearing ENMT: dry mucous membranes Cardiovascular: distal pulses intact Respiratory: breathing not labored, symmetric Musculoskeletal: no deformity, no tenderness to palpation Skin: warm, dry Neurologic: not following commands, moving all extremities, nonverbal  
 
 
 HISTORY:  
 
Active Problems: 
  Atrial fibrillation ([facility] [address] (11/5/2020) UTI (urinary tract infection) (11/5/2020) Elevated troponin (11/5/2020) BRETT (acute kidney injury) ([address] (11/5/2020) Past Medical History:  
Diagnosis Date  Alzheimer disease ([address] 03/11/2020  Glaucoma  Heart abnormality   
 arrythmia  Hypertension  Reflux  Short leg syndrome, right, acquired Past Surgical History:  
Procedure Laterality Date  HX HEENT  VASCULAR SURGERY PROCEDURE UNLIST Family History Problem Relation Age of Onset  Hypertension Mother  Diabetes Mother  Heart Disease Mother  Diabetes Father  Cancer Sister  Cancer Brother History reviewed, no pertinent family history. Social History Tobacco Use  Smoking status: Never Smoker  Smokeless tobacco: Never Used Substance Use Topics  Alcohol use: No  
 
Allergies Allergen Reactions  Tylenol [Acetaminophen] Other (comments) GI distress Current Facility-Administered Medications Medication Dose Route Frequency  potassium bicarb-citric acid (EFFER-K) tablet 40 mEq  40 mEq Oral Q4H  
 ciprofloxacin HCl (CIPRO) tablet 500 mg  500 mg Oral Q18H  
 aspirin delayed-release tablet 81 mg  81 mg Oral DAILY  atorvastatin (LIPITOR) tablet 10 mg  10 mg Oral DAILY  dorzolamide (TRUSOPT) 2 % ophthalmic solution 1 Drop  1 Drop Both Eyes TID  cilostazoL (PLETAL) tablet 100 mg  100 mg Oral ACB&D  clopidogreL (PLAVIX) tablet 75 mg  75 mg Oral DAILY  DULoxetine (CYMBALTA) capsule 20 mg  20 mg Oral DAILY  timolol (TIMOPTIC) 0.5 % ophthalmic solution 1 Drop  1 Drop Both Eyes BID  memantine (NAMENDA) tablet 10 mg  10 mg Oral DAILY  pantoprazole (PROTONIX) tablet 20 mg  20 mg Oral ACB  polyethylene glycol (MIRALAX) packet 17 g  17 g Oral DAILY PRN  
 ondansetron (ZOFRAN ODT) tablet 4 mg  4 mg Oral Q8H PRN Or  
 ondansetron (ZOFRAN) injection 4 mg  4 mg IntraVENous Q6H PRN  
 heparin (porcine) injection 5,000 Units  5,000 Units SubCUTAneous Q12H  
 metoprolol tartrate (LOPRESSOR) tablet 25 mg  25 mg Oral Q12H  
 dextrose 5% infusion  100 mL/hr IntraVENous CONTINUOUS  
 LORazepam (ATIVAN) injection 1 mg  1 mg IntraVENous Q6H PRN  
 
 
 
 LAB AND IMAGING FINDINGS:  
 
Lab Results Component Value Date/Time WBC 3.3 (L) 11/09/2020 05:08 AM  
 HGB 10.3 (L) 11/09/2020 05:08 AM  
 PLATELET 984 59/27/4348 05:08 AM  
 
Lab Results Component Value Date/Time  Sodium 155 (H) 11/09/2020 05:08 AM  
 Potassium 2.7 (LL) 11/09/2020 05:08 AM  
 Chloride 122 (H) 11/09/2020 05:08 AM  
 CO2 26 11/09/2020 05:08 AM  
 BUN 39 (H) 11/09/2020 05:08 AM  
 Creatinine 1.08 11/09/2020 05:08 AM  
 Calcium 8.4 (L) 11/09/2020 05:08 AM  
  
Lab Results Component Value Date/Time Alk. phosphatase 75 11/09/2020 05:08 AM  
 Protein, total 4.2 (L) 11/09/2020 05:08 AM  
 Albumin 1.8 (L) 11/09/2020 05:08 AM  
 Globulin 2.4 11/09/2020 05:08 AM  
 
Lab Results Component Value Date/Time INR 0.9 03/11/2020 09:16 AM  
 Prothrombin time 12.4 03/11/2020 09:16 AM  
  
No results found for: IRON, FE, TIBC, IBCT, PSAT, FERR No results found for: PH, PCO2, PO2 No components found for: [place] Lab Results Component Value Date/Time  (H) 11/06/2020 09:58 AM  
 CK - MB 4.9 (H) 11/06/2020 09:58 AM  
  
 
 
   
 
Total time: 15 minutes Counseling / coordination time, spent as noted above: 10 minutes 
> 50% counseling / coordination?: yes, patient family, RN, MD 
 
Prolonged service was provided for  []30 min   []75 min in face to face time in the presence of the patient, spent as noted above. Time Start:  
Time End:  
Note: this can only be billed with 78409 (initial) or 59650 (follow up). If multiple start / stop times, list each separately.

## 2020-11-09 NOTE — DISCHARGE SUMMARY
Discharge Summary Patient: Ivan Reaves MRN: [id number]  CSN: [id number] YOB: 1924  Age: 80 y.o. Sex: female DOA: 11/5/2020 LOS:  LOS: 4 days   Discharge Date:   
 
Admission Diagnoses: UTI (urinary tract infection) [N39.0] BRETT (acute kidney injury) ([facility] [address] [N17.9] Atrial fibrillation ([address] [I48.91] Elevated troponin [R77.8] UTI (urinary tract infection) [N39.0] Discharge Diagnoses: UTI Failure to thrive BRETT A. fib with RVR Hyponatremia Elevated troponindemand ischemia History of CVA History of PAD Baseline dementia Discharge Condition: Stable Consults: Cardiology and Nephrology PHYSICAL EXAM 
Visit Vitals BP (!) 142/64 (BP 1 Location: Right arm, BP Patient Position: Lying left side) Pulse 91 Temp 97.6 °F (36.4 °C) Resp 17 Ht 5' 3\" (1.6 m) Wt 43.5 kg (96 lb) SpO2 91% BMI 17.01 kg/m² General: Alert, cooperative, no acute distress HEENT: PERRLA, EOMI. Anicteric sclerae. Lungs:  CTA Bilaterally. No Wheezing/Rhonchi/Rales. Heart:  Regular rate and Rhythm. Abdomen: Soft, Non distended, Non tender. + Bowel sounds. Extremities: No edema/ cyanosis/ clubbing Psych:   Good insight. Not anxious or agitated. Neurologic:  AA oriented X 3. Moves all extremities. HPI: 
Ivan Revaes is a 80 y.o. female who is a resident of [facility], at baseline she is AO x1presents to the emergency room secondary to refusing to eat or drink for the past week. Patient is unable to give any significant information. ER evaluationpatient noted to have UTI, hyponatremia, BRETT, elevated troponin. Patient has been started on IV fluids, IV antibiotics, will be admitted to the hospital for further evaluation and treatment. 
  
Hospital Course:  
80year-old female resident of [facility] admitted to the hospital secondary to BRETT, hypernatremia, poor oral intake, failure to thrive, UTI, A. fib with RVR. Patient has history of baseline dementia so was unable to give any significant information. Patient was admitted to the hospital started on IV antibioticsurine culture grew out E. coli and Klebsiella both sensitive to ciprofloxacin patient is being discharged on p.o. antibiotics. Patient was also noted to have BRETT and hyponatremianephrology consulted, likely felt to be prerenal, patient was started on IVF with improvement in her numbers. Patient was also seen by cardiology for new onset A. fib with RVR however given her age and multiple comorbid conditions plan was to manage her conservatively. Patient was seen by nutrition and have recommended 
downgrade diet consistency to mechanical soft for easier feeding/ encouragement of po intake - Modify supplements: change to Magic Cup BID, Ensure Pudding BID 
- Encourage po intake of meals and supplements. Assistance as needed. Patient was seen by palliative care and transition to DNR. I spoke with the patient's niece in detail and she would like to treat patient at this time with the thought process that she might improve however if she does not then she would consider transitioning her over to comfort measures. Patient is currently at baseline is being discharged back to [facility]. Imaging: XR Results (most recent): 
Results from Hospital Encounter encounter on 11/05/20 XR CHEST PORT Narrative INDICATION: Altered mental status TECHNIQUE: Portable chest radiograph COMPARISON: None FINDINGS:  
 
Multiple medical assist devices overlie the patient, thus limiting 
interpretation. The lungs are adequately inflated. No focal consolidation, effusion or 
pneumothorax. Heart size within normal limits. No acute osseous abnormality. Right distal rotator cuff calcific tendinitis. Impression IMPRESSION:  
 
No acute findings. 11/05/20 ECHO ADULT COMPLETE 11/06/2020 11/6/2020 Narrative · Image quality for this study was technically difficult. · Patient was in the fetal position during entire test. 
· LV: Estimated LVEF is 50 - 55%. Visually measured ejection fraction. Small left ventricle. Mild concentric hypertrophy. Low normal systolic  
function. Wall motion: normal. Inconclusive left ventricular diastolic  
function. · PA: Pulmonary arterial systolic pressure is 33 mmHg. Signed by: Hannah Deleon MD  
  
 
 
Procedures:  
None Discharge Medications:    
Current Discharge Medication List  
  
START taking these medications Details  
ciprofloxacin HCl (CIPRO) 500 mg tablet Take 1 Tab by mouth every eighteen (18) hours for 6 days. Qty: 8 Tab, Refills: 0 CONTINUE these medications which have CHANGED Details  
metoprolol tartrate (LOPRESSOR) 25 mg tablet Take 1 Tab by mouth every twelve (12) hours. Qty: 30 Tab, Refills: 0 CONTINUE these medications which have NOT CHANGED Details  
clopidogreL (PLAVIX) 75 mg tab Take 1 Tab by mouth daily. Qty: 90 Tab, Refills: 3  
  
duloxetine HCl (CYMBALTA PO) Take  by mouth. omeprazole (PRILOSEC) 20 mg capsule Take 20 mg by mouth daily. memantine (NAMENDA) 10 mg tablet Take  by mouth daily. aspirin delayed-release 81 mg tablet Take  by mouth daily. senna (SENOKOT) 8.6 mg tablet Take 1 Tab by mouth daily. atorvastatin (LIPITOR) 10 mg tablet Take  by mouth daily. polyethylene glycol (MIRALAX) 17 gram packet Take 17 g by mouth daily. therapeutic multivitamin (THERAGRAN) tablet Take 1 Tab by mouth daily. levobunolol (BETAGAN) 0.5 % ophthalmic solution Administer 1 Drop to both eyes nightly. brinzolamide (AZOPT) 1 % ophthalmic suspension Administer 1 Drop to both eyes three (3) times daily. STOP taking these medications  
  
 lidocaine-prilocaine (EMLA) topical cream Comments:  
Reason for Stopping:   
   
 cilostazol (PLETAL) 100 mg tablet Comments: Reason for Stopping:   
   
 hydroCHLOROthiazide (HYDRODIURIL) 25 mg tablet Comments:  
Reason for Stopping: QUEtiapine (SEROQUEL) 50 mg tablet Comments:  
Reason for Stopping:   
   
 cilostazol (PLETAL) 100 mg tablet Comments:  
Reason for Stopping:   
   
 magnesium citrate solution Comments:  
Reason for Stopping:   
   
 metoclopramide HCl (REGLAN) 10 mg tablet Comments:  
Reason for Stopping:   
   
 traMADol (ULTRAM) 50 mg tablet Comments:  
Reason for Stopping:   
   
 meloxicam (MOBIC) 7.5 mg tablet Comments:  
Reason for Stopping:   
   
 meclizine (ANTIVERT) 25 mg tablet Comments:  
Reason for Stopping:   
   
 amLODIPine (NORVASC) 10 mg tablet Comments:  
Reason for Stopping:   
   
 RABEprazole (ACIPHEX) 20 mg tablet Comments:  
Reason for Stopping:   
   
 Calcium Gluconate 60 mg (648 mg) tab Comments:  
Reason for Stopping:   
   
  
  
Current Facility-Administered Medications:  
  potassium bicarb-citric acid (EFFER-K) tablet 40 mEq, 40 mEq, Oral, Q4H, Nicholas Ruano MD, 40 mEq at 11/09/20 1003   ciprofloxacin HCl (CIPRO) tablet 500 mg, 500 mg, Oral, Q18H, Amrit Gardner MD, 500 mg at 11/09/20 7534   aspirin delayed-release tablet 81 mg, 81 mg, Oral, DAILY, Amrit Gardner MD, 81 mg at 11/09/20 4846   atorvastatin (LIPITOR) tablet 10 mg, 10 mg, Oral, DAILY, Amrit Gardner MD, 10 mg at 11/09/20 1664   dorzolamide (TRUSOPT) 2 % ophthalmic solution 1 Drop, 1 Drop, Both Eyes, TID, Amrit Gardner MD, 1 Drop at 11/07/20 2301   cilostazoL (PLETAL) tablet 100 mg, 100 mg, Oral, ACB&D, Amrit Gardner MD, 100 mg at 11/09/20 6341   clopidogreL (PLAVIX) tablet 75 mg, 75 mg, Oral, DAILY, Amrit Gardner MD, 75 mg at 11/09/20 2764   DULoxetine (CYMBALTA) capsule 20 mg, 20 mg, Oral, DAILY, Amrit Gardner MD, 20 mg at 11/08/20 1048   timolol (TIMOPTIC) 0.5 % ophthalmic solution 1 Drop, 1 Drop, Both Eyes, BID, Amrit Gardner MD, 1 Drop at 11/07/20 6347   memantine (NAMENDA) tablet 10 mg, 10 mg, Oral, DAILY, Lashay Chamberlain MD, 10 mg at 11/08/20 1048   pantoprazole (PROTONIX) tablet 20 mg, 20 mg, Oral, ACB, Lashay Chamberlain MD, 20 mg at 11/09/20 8293   polyethylene glycol (MIRALAX) packet 17 g, 17 g, Oral, DAILY PRN, Lashay Chamberlain MD 
  ondansetron (ZOFRAN ODT) tablet 4 mg, 4 mg, Oral, Q8H PRN **OR** ondansetron (ZOFRAN) injection 4 mg, 4 mg, IntraVENous, Q6H PRN, Lashay Chamberlain MD 
  heparin (porcine) injection 5,000 Units, 5,000 Units, SubCUTAneous, Q12H, Lashay Chamberlain MD, 5,000 Units at 11/08/20 1431 
  metoprolol tartrate (LOPRESSOR) tablet 25 mg, 25 mg, Oral, Q12H, Krystin Castellanos PA, 25 mg at 11/09/20 2870   dextrose 5% infusion, 100 mL/hr, IntraVENous, CONTINUOUS, Alireza Sauceda MD, Stopped at 11/07/20 2312   LORazepam (ATIVAN) injection 1 mg, 1 mg, IntraVENous, Q6H PRN, Koleen Hashimoto, MD, 1 mg at 11/08/20 1504 · It is important that you take the medication exactly as they are prescribed. · Keep your medication in the bottles provided by the pharmacist and keep a list of the medication names, dosages, and times to be taken in your wallet. · Do not take other medications without consulting your doctor. Discharge To: SNF Minutes spent on discharge: 45 minutes spent coordinating this discharge (review instructions/follow-up, prescriptions, preparing report for sign off) Kareen Bhatia MD 
11/9/2020 11:19 AM

## 2020-11-09 NOTE — PROGRESS NOTES
[facility] - Progress Note Admit Date: 11/5/2020 Assessment:  
 
-Presented from NH with decreased oral intake over the previous week. -Atrial fibrillation with RVR, new diagnosis in setting of below.  Poor candidate for aggressive NOAC, plan ASA for now. 
-Indeterminate troponin, follow up pending, suspect secondary demand ischemia in setting of below. 
-Abnormal ECG with ST depressions in setting of tachycardia and hypotension. 
-Echo 11/6/2020: EF 50-55%, mild concentric LVH with small left ventricle, normal LV wall motion, inconclusive diastolic function 
-Urinary tract infection. 
-Acute kidney injury. 
-Hypernatremia. 
-Hypokalemia. -Hypertension. -H/o PAD. 
-H/o CVA. -Dementia. -DNR/DNI status. 
  
No primary cardiologist. 
  
Plan:  
  
Remains in afib, continue PO Lopressor as able/tolerated. Please call if questions. Subjective: No new complaints. Objective:  
  
Patient Vitals for the past 8 hrs: 
 Temp Pulse Resp BP SpO2  
11/09/20 1125 97.5 °F (36.4 °C) 68 20 (!) 116/54 94 % 11/09/20 0759 97.6 °F (36.4 °C) 91 17 (!) 142/64 91 % Patient Vitals for the past 96 hrs: 
 Weight 11/07/20 0414 43.5 kg (96 lb) 11/06/20 1925 43.5 kg (95 lb 14.4 oz) 11/06/20 1415 43.5 kg (96 lb) 11/06/20 0412 43.5 kg (96 lb) No intake or output data in the 24 hours ending 11/09/20 1254 Physical Exam: 
General:   Confused, cachectic Neck:  nontender Lungs:  clear to auscultation bilaterally Heart:  regular rate and rhythm, S1, S2 normal, no murmur, click, rub or gallop Abdomen:  abdomen is soft without significant tenderness, masses, organomegaly or guarding Extremities:  extremities normal, atraumatic, no cyanosis or edema Data Review:  
 
Labs: Results:  
   
Chemistry Recent Labs 11/09/20 
3344 11/08/20 
0127 11/07/20 
0020 * 98 213* * 156* 158*  
K 2.7* 3.5 3.3*  
* 121* 124* CO2 26 29 26 BUN 39* 49* 64* CREA 1.08 1.40* 1.96* CA 8.4* 9.0 8.9 AGAP 7 6 8 BUCR 36* 35* 33* AP 75 77 71  
TP 4.2* 5.5* 5.5* ALB 1.8* 2.1* 2.2*  
GLOB 2.4 3.4 3.3 AGRAT 0.8 0.6* 0.7* CBC w/Diff Recent Labs 11/09/20 
6771 11/08/20 
0127 11/07/20 
0020 WBC 3.3* 5.4 6.7 RBC 3.35* 3.60* 3.56* HGB 10.3* 11.2* 11.1*  
HCT 32.0* 34.6* 34.6*  
 186 180 GRANS 47 55 62 LYMPH 29 27 22 EOS 6* 5 4 Cardiac Enzymes No results found for: CPK, CK, CKMMB, CKMB, RCK3, CKMBT, CKNDX, CKND1, BERTRAND, TROPT, TROIQ, TRACY, TROPT, TNIPOC, BNP, BNPP Coagulation No results for input(s): PTP, INR, APTT, INREXT in the last 72 hours. Lipid Panel No results found for: CHOL, CHOLPOCT, CHOLX, CHLST, CHOLV, 716297, HDL, HDLP, LDL, LDLC, DLDLP, 082037, VLDLC, VLDL, TGLX, TRIGL, TRIGP, TGLPOCT, CHHD, CHHDX  
BNP No results found for: BNP, BNPP, XBNPT Liver Enzymes Recent Labs 11/09/20 
3602 TP 4.2* ALB 1.8* AP 75 Digoxin Thyroid Studies No results found for: T4, T3U, TSH, TSHEXT Signed By: Jason Ribeiro MD   
 November 9, 2020

## 2020-11-19 PROBLEM — Z51.5 HOSPICE CARE PATIENT: Status: ACTIVE | Noted: 2020-01-01

## 2020-12-01 NOTE — Clinical Note
Sheath #1: Dressed using transparent dressing. Site: clean, dry, & intact, no bleeding and no hematoma. Continue Regimen: Emollients\\nMild cleansers\\nFluticasone Cream twice a day to psoriasis on arms/legs (Sat & Sun)\\nVectical Ointment twice a day to psoriasis on arms/legs (Monday-Friday)\\nMethotrexate and Enbrel per Dr. Mcgee Detail Level: Detailed

## 2022-04-26 NOTE — Clinical Note
[place]  Department of Internal Medicine  Division of Pulmonary, Critical Care and Sleep Medicine  Consult Note    Kaycee Tovar DO, MPH, [facility], Chris Krueger MD, [facility]  [place] [facility]      Patient: Akila Hope  MRN: [id number]  : 1944    Encounter Time: 5:52 PM     Date of Admission: 2022  5:33 PM    Primary Care Physician: Cary Cobb MD    Reason for Consultation: COVID-19, shortness of breath. HISTORY OF PRESENT ILLNESS : Akila Hope 66 y.o. male was seen in consultation regarding the above chief compliant. Patient has been seen at our facility multiple times recently due to issues of hypoxemic respiratory failure. He had recently been discharged a little over a week ago. He reports to me that he was progressively more short of breath at the facility yesterday after dialysis subsequently this was found to be due to a hole in his oxygen tubing. He was also found to be COVID-positive however he was COVID-negative when he was discharged from the hospital.  Currently he reports that he feels like his breathing is okay. He reports that over the last couple weeks he has been doing much better since discharge he had been getting stronger and had been getting up with therapy. He reports he had been able to walk in the gym. He had been compliant with his dialysis. Overall things had been significantly improving. PAST MEDICAL HISTORY:  has a past medical history of Acid reflux, Agent orange exposure, Arthritis, CAD (coronary artery disease), Congestive heart failure (CHF) ([facility]), COPD (chronic obstructive pulmonary disease) ([address] Depression, Diabetes mellitus ([facility] [address] History of blood transfusion, Hyperlipidemia, Hypertension, MI (myocardial infarction) ([facility] [address] Polio, and Sleep apnea.     SURGICAL HISTORY:  has a past surgical history that includes Coronary angioplasty with stent; Cardiac pacemaker placement; TRANSFER - IN REPORT:     Verbal report received from: Chiqui Mays RN. Report consisted of patient's Situation, Background, Assessment and   Recommendations(SBAR). Opportunity for questions and clarification was provided. Assessment completed upon patient's arrival to unit and care assumed. Patient transported with a Cardiac Cath Tech / Patient Care Tech. pacemaker placement; hernia repair; and vascular surgery (N/A, 4/4/2022). SOCIAL HISTORY:  reports that he has quit smoking. He has never used smokeless tobacco. He reports that he does not drink alcohol and does not use drugs. FAMILY  HISTORY: family history is not on file. MEDICATIONS:    Prior to Admission medications    Medication Sig Start Date End Date Taking?  Authorizing Provider   ondansetron (ZOFRAN-ODT) 4 MG disintegrating tablet Take 1 tablet by mouth every 8 hours as needed for Nausea or Vomiting 4/12/22   Salina Miller MD   midodrine (PROAMATINE) 10 MG tablet Take 1 tablet by mouth as needed (give 30 min before dialysis) 4/12/22   Salina Miller MD   bumetanide Rozanna Dellen) 2 MG tablet Take 1 tablet by mouth daily 4/6/22   Salina Miller MD   docusate sodium (COLACE) 100 MG capsule Take 1 capsule by mouth 2 times daily 3/22/22   Salina Miller MD   guaiFENesin 400 MG tablet Take 400 mg by mouth 4 times daily as needed for Cough    Historical Provider, MD   ipratropium-albuterol (DUONEB) 0.5-2.5 (3) MG/3ML SOLN nebulizer solution Take 1 vial by nebulization every 4 hours as needed for Shortness of Breath    Historical Provider, MD   potassium chloride (KLOR-CON M) 10 MEQ extended release tablet Take 20 mEq by mouth daily    Historical Provider, MD   insulin glargine (LANTUS SOLOSTAR) 100 UNIT/ML injection pen Inject 50 Units into the skin nightly    Historical Provider, MD   loperamide (IMODIUM) 2 MG capsule Take 2 mg by mouth 4 times daily as needed for Diarrhea    Historical Provider, MD   buPROPion (WELLBUTRIN XL) 300 MG extended release tablet Take 300 mg by mouth every morning    Historical Provider, MD   ezetimibe (ZETIA) 10 MG tablet Take 10 mg by mouth at bedtime    Historical Provider, MD   Zinc Sulfate 110 MG TABS Take 2 tablets by mouth daily    Historical Provider, MD   magnesium hydroxide (MILK OF MAGNESIA) 400 MG/5ML suspension Take 30 mLs by mouth daily as needed for Constipation    Historical Provider, MD   melatonin 3 MG TABS tablet Take 3 mg by mouth at bedtime    Historical Provider, MD   insulin lispro, 1 Unit Dial, (HUMALOG KWIKPEN) 100 UNIT/ML SOPN Inject 0-10 Units into the skin 4 times daily (before meals and nightly) *Per Sliding Scale*    Historical Provider, MD   metoprolol succinate (TOPROL XL) 25 MG extended release tablet Take 1 tablet by mouth 2 times daily 2/16/22   GILMAR Zuleta - CNP   acetaminophen (TYLENOL) 325 MG tablet Take 650 mg by mouth every 4 hours as needed for Pain or Fever     Historical Provider, MD   vitamin C (ASCORBIC ACID) 500 MG tablet Take 500 mg by mouth 2 times daily    Historical Provider, MD   vitamin D (CHOLECALCIFEROL) 50 MCG (2000 UT) TABS tablet Take 2,000 Units by mouth daily     Historical Provider, MD   aspirin 81 MG EC tablet Take 1 tablet by mouth daily 2/3/22   Salina Miller MD   folic acid (FOLVITE) 1 MG tablet Take 1 mg by mouth daily    Historical Provider, MD   cyanocobalamin 1000 MCG tablet Take 1,000 mcg by mouth daily    Historical Provider, MD   atorvastatin (LIPITOR) 40 MG tablet Take 40 mg by mouth at bedtime     Historical Provider, MD   pantoprazole (PROTONIX) 40 MG tablet Take 40 mg by mouth daily    Historical Provider, MD       ALLERGIES: Penicillins       REVIEW OF SYSTEMS:  Otherwise negative if not reported or listed below  Constitutional:  Denies weight loss or gain     Positive for difficulty sleeping     No fevers, chills or rigors. Eyes:    No visual changes or diplopia. No scleral icterus. ENT:    No Headaches, hearing loss or vertigo. No mouth sores or sore throat. Cardiovascular:  Denies chest tightness. Denies palpitations  Respiratory:  Currently reports that breathing is at his baseline. Denies wheezing, coughing, hemoptysis. Gastrointestinal:  No abdominal pain, appetite loss, blood in stools.     Genitourinary:  No dysuria, trouble voiding or hematuria. No nocturia. Musculoskeletal:   No weakness or joint complaints. Integumentary: No rashes or pruritis. Neurological:  No headache, numbness or tingling. Psychiatric:   Positive for depression  Endocrine:   No excessive thirst, fluid intake, or urination. No tremor. Hematologic: No abnormal bruising or bleeding. Lymphatic:  No swollen lymph nodes. Immunologic:  No hives or hx of anaphaxsis. OBJECTIVE:     PHYSICAL EXAM:   VITALS:   Vitals:    04/26/22 0500 04/26/22 0600 04/26/22 1130 04/26/22 1700   BP: 106/68 97/69 118/73 116/75   Pulse: 81 80 80 82   Resp: 17 17 18 16   Temp:   97.5 °F (36.4 °C) 98.1 °F (36.7 °C)   TempSrc:   Tympanic Oral   SpO2: 96%      Weight:       Height:          No intake or output data in the 24 hours ending 04/26/22 1752     CONSTITUTIONAL:   A&O x 3, no distress  SKIN:     No rash, No suspicious lesions or skin discoloration  HEENT:     EOMI, MMM, No thrush  NECK:    No bruits, no JVD  CV:      Sinus,  No murmur, No rubs, No gallops  PULMONARY:   Breath sounds are clear in upper lobes diminished in bases bilaterally. ABDOMEN:     Soft, non-tender. BS normal. No R/R/G  EXT:    No deformities . No clubbing. lower extremity edema remains present however is significantly reapproved from the last time I had seen him  PULSE:   Appears equal and palpable.   PSYCHIATRIC:  Seems appropriate, No acute psycosis  MS:    No fractures, No gross weakness  NEUROLOGIC:   The clinical assessment is non-focal     DATA: IMAGING & TESTING:     LABORATORY TESTS:    CBC with Differential:    Lab Results   Component Value Date    WBC 11.5 04/25/2022    RBC 3.55 04/25/2022    HGB 10.3 04/25/2022    HCT 34.8 04/25/2022     04/25/2022    MCV 98.0 04/25/2022    MCH 29.0 04/25/2022    MCHC 29.6 04/25/2022    RDW 18.0 04/25/2022    LYMPHOPCT 12.9 04/25/2022    MONOPCT 13.2 04/25/2022    BASOPCT 0.3 04/25/2022    MONOSABS 1.52 04/25/2022    LYMPHSABS 1.48 04/25/2022    EOSABS 0.11 04/25/2022    BASOSABS 0.03 04/25/2022     CMP:    Lab Results   Component Value Date     04/25/2022    K 5.5 04/25/2022     04/25/2022    CO2 30 04/25/2022    BUN 26 04/25/2022    CREATININE 3.7 04/25/2022    GFRAA 19 04/25/2022    LABGLOM 16 04/25/2022    GLUCOSE 112 04/25/2022    PROT 7.0 04/25/2022    LABALBU 3.9 04/25/2022    CALCIUM 8.8 04/25/2022    BILITOT 0.8 04/25/2022    ALKPHOS 119 04/25/2022    AST 47 04/25/2022    ALT 35 04/25/2022        PRO-BNP:   Lab Results   Component Value Date    PROBNP 5,643 (H) 04/25/2022    PROBNP 1,832 (H) 04/07/2022      ABGs:   Lab Results   Component Value Date    PH 7.470 04/25/2022    PO2 59.8 04/25/2022    PCO2 36.1 04/25/2022     Hemoglobin A1C: No components found for: HGBA1C    IMAGING:  Imaging tests were completed and reviewed and discussed radiology and care team involved and reveals   Echo Limited    Result Date: 3/19/2022  Transthoracic Echocardiography Report (TTE)  Demographics   Patient Name    Beverly Pronto  Gender            Male                  [address] [facility]  [id number]      Room Number       5444  Number   Account #       [de-identified]     Procedure Date    03/19/2022   Corporate ID                  Ordering          Charli Muller MD                                Physician   Accession       [id number]    Referring  Number                        Physician   Date of Birth   1944    Sonographer       Beti Cartagena [facility]   Age             66 year(s)    Interpreting      [address]                                Physician         Physician Cardiology                                                  Doreen Pickens MD                                 Any Other  Procedure Type of Study   TTE procedure:Echo Limited Study. Procedure Date Date: 03/19/2022 Start: 08:53 AM Study Location: Portable Technical Quality: Adequate visualization Indications:Pericardial effusion.  Patient Status: Routine Height: 69 inches Weight: 271 pounds BSA: 2.35 m^2 BMI: 40.02 kg/m^2 BP: 95/68 mmHg  Findings   Left Ventricle  Left ventricle is grossly normal in size. No gross regional wall motion abnormalities seen. Grossly normal left ventricular ejection fraction. Indeterminate diastolic function. Right Ventricle  The right ventricle was not clearly visualized. Grossly normal right ventricular size and function. Pacer wire visualized in right ventricle. Left Atrium  Normal sized left atrium. Interatrial septum appears intact. Right Atrium  Normal right atrium size. Pacemaker lead was visualized in RA cavity. Mitral Valve  Structurally normal mitral valve. Tricuspid Valve  The tricuspid valve was not well visualized. Aortic Valve  The aortic valve leaflets were not well visualized. Pulmonic Valve  The pulmonic valve was not well visualized. Pericardial Effusion  There is a small anterior pericardial effusion noted. Aorta  Aorta was not clearly visualized. Conclusions   Summary  Technically suboptimal and limited study with images limited to subcostal  window. Left ventricle is grossly normal in size. No gross regional wall motion abnormalities seen. Grossly normal left ventricular ejection fraction. There is a small anterior pericardial effusion noted. Signature   ----------------------------------------------------------------  Electronically signed by Doreen Pickens MD(Interpreting  physician) on 03/19/2022 02:48 PM  ----------------------------------------------------------------  [email or web address] [id number]    XR CHEST (2 VW)    Result Date: 3/28/2022  EXAMINATION: TWO XRAY VIEWS OF THE CHEST 3/28/2022 4:08 pm COMPARISON: 03/21/2022 HISTORY: ORDERING SYSTEM PROVIDED HISTORY: SOB TECHNOLOGIST PROVIDED HISTORY: Reason for exam:->SOB What reading provider will be dictating this exam?->CRC FINDINGS: The cardiac silhouette is within normals. There are findings of CHF. There are bilateral pleural effusions, right greater than left. The right pleural effusion is large in size. There is no pneumothorax. 1. Findings of CHF 2. Bilateral pleural effusions, right greater than left. The right pleural effusion is large     XR CHEST (2 VW)    Result Date: 2/28/2022  EXAMINATION: TWO XRAY VIEWS OF THE CHEST 2/28/2022 10:20 am COMPARISON: 2/23/2022 HISTORY: ORDERING SYSTEM PROVIDED HISTORY: follow up pleural effusions TECHNOLOGIST PROVIDED HISTORY: Reason for exam:->follow up pleural effusions What reading provider will be dictating this exam?->CRC FINDINGS: Two-view chest reveals pacer to be in satisfactory position. Patchy ill-defined opacifications in the left lung base with moderate size right pleural effusion and small left pleural effusion. The effusion on the right is slightly increased in size in the interval.     Slight increase seen in size of the right pleural effusion, now moderate in size and small in size on the left. Ill-defined opacification again seen within the lung bases bilaterally, right greater than left. XR CHEST PORTABLE    Result Date: 3/21/2022  EXAMINATION: ONE XRAY VIEW OF THE CHEST 3/21/2022 1:10 pm COMPARISON: Chest radiograph March 15, 2022 HISTORY: ORDERING SYSTEM PROVIDED HISTORY: sob TECHNOLOGIST PROVIDED HISTORY: Reason for exam:->sob What reading provider will be dictating this exam?->CRC FINDINGS: Left cardiac pacing device again identified. Bilateral moderate pleural effusions with compressive atelectasis. There is mild worsened compared to prior exam.  No pneumothorax. The cardiomediastinal silhouette is without acute process. The osseous structures are without acute process. Moderate bilateral pleural effusions are mildly worsened.      XR CHEST PORTABLE    Result Date: 3/15/2022  EXAMINATION: ONE XRAY VIEW OF THE CHEST 3/15/2022 4:12 pm COMPARISON: 03/03/2022 HISTORY: ORDERING SYSTEM PROVIDED HISTORY: Thoracentesis TECHNOLOGIST PROVIDED HISTORY: Reason for exam:->Thoracentesis What reading provider will be dictating this exam?->CRC FINDINGS: Bibasilar opacities are seen, which appear somewhat less prominent than yesterday, however, that may be related differences in technique. The current study was obtained in a patel state of inspiration. The cardiomediastinal contour is unremarkable. Pacemaker in situ. No pneumothorax     1. Bibasilar opacities predominantly related to large pleural effusions, decreased as of yesterday. 2. In part at least, the decrease may be artifactual, related to differences in technique. On the right, the decrease is also attributable to recent thoracentesis. 3. No pneumothorax. XR CHEST PORTABLE    Result Date: 3/14/2022  EXAMINATION: ONE XRAY VIEW OF THE CHEST 3/14/2022 12:16 pm COMPARISON: 03/03/2022 and 02/28/2022 HISTORY: ORDERING SYSTEM PROVIDED HISTORY: shortness of breath TECHNOLOGIST PROVIDED HISTORY: Reason for exam:->shortness of breath What reading provider will be dictating this exam?->CRC FINDINGS: The cardiac silhouette is within normals. Bilateral lower lobe airspace disease is noted favored to represent atelectasis. There is a moderate right pleural effusion and small left pleural effusion. 1. Moderate right pleural effusion and small left pleural effusion 2. Bibasilar airspace disease favored to represent atelectasis secondary to the pleural effusions. .     XR CHEST PORTABLE    Result Date: 3/3/2022  EXAMINATION: ONE XRAY VIEW OF THE CHEST 3/3/2022 3:23 pm COMPARISON: 02/28/2022 HISTORY: ORDERING SYSTEM PROVIDED HISTORY: sob, vol overload TECHNOLOGIST PROVIDED HISTORY: Reason for exam:->sob, vol overload What reading provider will be dictating this exam?->CRC FINDINGS: Findings remain compatible with moderate bilateral pleural effusions greater on the right. Heart is normal in size. There is a left dual lead pacemaker.  No evidence of pneumothorax. The osseous structures are stable. Unchanged moderate bilateral pleural effusions greater on the right. CTA PULMONARY W CONTRAST    Result Date: 3/14/2022  EXAMINATION: CTA OF THE CHEST 3/14/2022 3:28 pm TECHNIQUE: CTA of the chest was performed after the administration of intravenous contrast.  Multiplanar reformatted images are provided for review. MIP images are provided for review. Dose modulation, iterative reconstruction, and/or weight based adjustment of the mA/kV was utilized to reduce the radiation dose to as low as reasonably achievable. COMPARISON: None. HISTORY: ORDERING SYSTEM PROVIDED HISTORY: elevated dimer TECHNOLOGIST PROVIDED HISTORY: Reason for exam:->elevated dimer Decision Support Exception - unselect if not a suspected or confirmed emergency medical condition->Emergency Medical Condition (MA) What reading provider will be dictating this exam?->CRC FINDINGS: CTA chest exam is technically satisfactory. Pulmonary Arteries: Normal caliber. No PE. Lungs and pleura: Bilateral large pleural effusions with secondary bilateral passive atelectasis. Right middle lobe 2.6 cm thin walled cyst or bulla. No central obstructing lesion identified. Heart and mediastinum: Normal heart size. Coronary artery calcifications. Left subclavian transvenous pacemaker with right atrial and right ventricular leads. Trace pericardial effusion. No lymphadenopathy. The thoracic aorta is atherosclerotic and normal in caliber. Upper abdomen: Limited visualization. Hepatic cirrhosis. Mild splenomegaly. Small ascites. Bones: No acute osseous abnormality. Chronic appearing Schmorl's node and concave compression deformity of the T3 superior endplate. 1.  No PE. 2.  Bilateral large pleural effusions with secondary bilateral passive atelectasis. 3.  Hepatic cirrhosis. Mild splenomegaly. Small ascites. US THORACENTESIS Which side should the procedure be performed?  Right    Result Date: 3/15/2022  PROCEDURE: ULTRASOUNDGUIDED RIGHT THORACENTESIS 3/15/2022 HISTORY: ORDERING SYSTEM PROVIDED HISTORY: thoracentesis TECHNOLOGIST PROVIDED HISTORY: Call dr raya at 1 pm at [address] for exam:->thoracentesis Which side should the procedure be performed?->Right TECHNIQUE: Ultrasound utilized by Dr. Ivon Barahona for right-sided thoracentesis. FINDINGS: Ultrasound images show right and left pleural effusions. Ultrasound utilized for thoracentesis procedure. For additional information, please refer to operative report. Assessment:   1. Acute on chronic hypoxemic respiratory failure  2. Positive for COVID-19.  3. Bilateral moderate to large pleural effusions bilaterally. 4. Dyspnea with exertion  5. 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 History of SPEEDY    Plan:   1. Obtain CRP, ferritin. Patient CT of the chest reviewed while the CT does show moderate to large pleural effusions bilaterally these do appear to be stable from prior scans no plan for thoracentesis at this time. .  Also no groundglass opacities identified consistent with COVID-19 pneumonia. 2. Wean FiO2 as tolerated  3. Continue CPAP at at bedtime and with naps, okay to use home CPAP  4. HD as per nephrology.     Compton Litter, DO   Pulmonary, Critical Care and Sleep Medicine

## (undated) DEVICE — VIPERWIRE, ADVANCE PERIPHERAL, .017" DIA SPRING TIP, 335CM, 5 PACK: Brand: VIPERWIRE, ADVANCE

## (undated) DEVICE — ARMADA 18 PTA CATHETER 5.0 MM X 200 MM X 150 CM / OVER-THE-WIRE: Brand: ARMADA

## (undated) DEVICE — COVER US PRB W15XL120CM W/ GEL RUBBERBAND TAPE STRP FLD GEN

## (undated) DEVICE — INTRODUCER SHTH 4FR CANN L11CM DIL TIP 25MM RED TUNGSTEN

## (undated) DEVICE — LUB GUID WR CARDIC CATH 100ML -- VIPERSLIDE

## (undated) DEVICE — Device: Brand: QUICK-CROSS SUPPORT CATHETER

## (undated) DEVICE — CATHETER ANGIO 4FR L125CM 0.038IN NYL HYDRPHLC COAT VERT

## (undated) DEVICE — ANGIOGRAPHY KIT CUST VASC

## (undated) DEVICE — PRESSURE MONITORING SET: Brand: TRUWAVE

## (undated) DEVICE — RADIFOCUS GLIDEWIRE: Brand: GLIDEWIRE

## (undated) DEVICE — RADIFOCUS GLIDEWIRE ADVANTAGE GUIDEWIRE: Brand: GLIDEWIRE ADVANTAGE

## (undated) DEVICE — ARMADA 18 PTA CATHETER 4.0 MM X 150 MM X 150 CM / OVER-THE-WIRE: Brand: ARMADA

## (undated) DEVICE — DEVICE INFL L13IN 40ATM 30ML BASIXTOUCH

## (undated) DEVICE — DESTINATION PERIPHERAL GUIDING SHEATH: Brand: DESTINATION

## (undated) DEVICE — RADIFOCUS TORQUE DEVICE MULTI-TORQUE VISE: Brand: RADIFOCUS TORQUE DEVICE

## (undated) DEVICE — RADIFOCUS GLIDECATH: Brand: GLIDECATH

## (undated) DEVICE — CATHETER ANGIO JUDKINS CRV 3 035 AD 4 FRX65 CM TEMPO AQUA

## (undated) DEVICE — CATHETER ANGIO AD PED 4FR L65CM GWIRE 0.035IN PERIPH

## (undated) DEVICE — SYR ART 700 CLEAR MARK 7 -- ARTERION

## (undated) DEVICE — PROCEDURE KIT FLUID MGMT 10 FR CUST MAINFOLD

## (undated) DEVICE — SET FLD ADMIN 3 W STPCOCK FIX FEM L BOR 1IN

## (undated) DEVICE — ANGIO-SEAL VIP VASCULAR CLOSURE DEVICE: Brand: ANGIO-SEAL

## (undated) DEVICE — PTA BALLOON DILATATION CATHETER: Brand: COYOTE™

## (undated) DEVICE — DIAMONDBACK PERIPHERAL, SOLID CROWN, 1.50MM, 145CM SHAFT: Brand: DIAMONDBACK PERIPHERAL

## (undated) DEVICE — PACK PROCEDURE SURG VASC CATH 161 MMC LF

## (undated) DEVICE — Device: Brand: ANGIOSCULPT OTW PTA SCORING BALLOON CATHETER

## (undated) DEVICE — CATHETER ANGIO BER2 038 AD 4 FRX100 CM TEMPO AQUA

## (undated) DEVICE — DRAPE,ANGIO,BRACH,STERILE,38X44: Brand: MEDLINE

## (undated) DEVICE — COPILOT KIT INCLUDES BLEEDBACK CONTROL VALVE 20/30 INDEFLATOR INFLATION DEVICE 30 ATM 20 CC / GUIDE WIRE INTRODUCER / TORQUE DEVICE: Brand: INDEFLATOR